# Patient Record
Sex: MALE | Race: WHITE | HISPANIC OR LATINO | Employment: OTHER | URBAN - METROPOLITAN AREA
[De-identification: names, ages, dates, MRNs, and addresses within clinical notes are randomized per-mention and may not be internally consistent; named-entity substitution may affect disease eponyms.]

---

## 2018-10-15 ENCOUNTER — TRANSCRIBE ORDERS (OUTPATIENT)
Dept: ADMINISTRATIVE | Facility: HOSPITAL | Age: 63
End: 2018-10-15

## 2018-10-15 ENCOUNTER — HOSPITAL ENCOUNTER (OUTPATIENT)
Dept: RADIOLOGY | Facility: HOSPITAL | Age: 63
Discharge: HOME | End: 2018-10-15
Payer: MEDICARE

## 2018-10-15 DIAGNOSIS — M79.602 PAIN OF LEFT UPPER EXTREMITY: ICD-10-CM

## 2018-10-15 DIAGNOSIS — M54.2 NECK PAIN: ICD-10-CM

## 2018-10-15 DIAGNOSIS — M54.2 NECK PAIN: Primary | ICD-10-CM

## 2018-10-15 PROCEDURE — 72052 X-RAY EXAM NECK SPINE 6/>VWS: CPT

## 2018-10-15 PROCEDURE — 73030 X-RAY EXAM OF SHOULDER: CPT

## 2019-01-14 ENCOUNTER — HOSPITAL ENCOUNTER (EMERGENCY)
Facility: HOSPITAL | Age: 64
Discharge: HOME | End: 2019-01-14
Attending: EMERGENCY MEDICINE | Admitting: EMERGENCY MEDICINE
Payer: MEDICARE

## 2019-01-14 VITALS
SYSTOLIC BLOOD PRESSURE: 135 MMHG | OXYGEN SATURATION: 94 % | WEIGHT: 170 LBS | TEMPERATURE: 99.9 F | DIASTOLIC BLOOD PRESSURE: 93 MMHG | BODY MASS INDEX: 27.32 KG/M2 | HEART RATE: 86 BPM | HEIGHT: 66 IN | RESPIRATION RATE: 18 BRPM

## 2019-01-14 DIAGNOSIS — J40 BRONCHITIS: Primary | ICD-10-CM

## 2019-01-14 LAB
FLUAV AG SPEC QL IA: NEGATIVE
FLUBV AG SPEC QL IA: NEGATIVE

## 2019-01-14 PROCEDURE — 87631 RESP VIRUS 3-5 TARGETS: CPT | Performed by: EMERGENCY MEDICINE

## 2019-01-14 PROCEDURE — 99283 EMERGENCY DEPT VISIT LOW MDM: CPT

## 2019-01-14 RX ORDER — CEPHALEXIN 250 MG/1
500 CAPSULE ORAL ONCE
Status: DISCONTINUED | OUTPATIENT
Start: 2019-01-14 | End: 2019-01-14

## 2019-01-14 RX ORDER — CEPHALEXIN 250 MG/1
500 CAPSULE ORAL 4 TIMES DAILY
Qty: 40 CAPSULE | Refills: 0 | Status: SHIPPED | OUTPATIENT
Start: 2019-01-14 | End: 2019-01-24

## 2019-01-14 RX ORDER — PANTOPRAZOLE SODIUM 40 MG/1
TABLET, DELAYED RELEASE ORAL
COMMUNITY
Start: 2018-09-05

## 2019-01-14 RX ORDER — SIMVASTATIN 40 MG
80 TABLET ORAL EVERY EVENING
COMMUNITY
Start: 2018-10-15

## 2019-01-14 RX ORDER — METOPROLOL SUCCINATE 25 MG/1
25 TABLET, EXTENDED RELEASE ORAL DAILY
COMMUNITY
Start: 2018-10-15

## 2019-01-14 RX ORDER — GABAPENTIN 300 MG/1
300 CAPSULE ORAL DAILY
COMMUNITY
Start: 2018-11-13 | End: 2019-11-13

## 2019-01-14 RX ORDER — LISINOPRIL 40 MG/1
40 TABLET ORAL DAILY
COMMUNITY
Start: 2018-10-15 | End: 2019-10-15

## 2019-01-14 RX ORDER — ASPIRIN 81 MG/1
324 TABLET, CHEWABLE ORAL DAILY
COMMUNITY
Start: 2018-01-19 | End: 2020-03-23

## 2019-01-14 RX ORDER — GLIPIZIDE 5 MG/1
TABLET ORAL
COMMUNITY
Start: 2018-10-15

## 2019-01-14 RX ORDER — CEPHALEXIN 250 MG/1
500 CAPSULE ORAL ONCE
Status: COMPLETED | OUTPATIENT
Start: 2019-01-14 | End: 2019-01-14

## 2019-01-14 RX ORDER — ALBUTEROL SULFATE 90 UG/1
2 AEROSOL, METERED RESPIRATORY (INHALATION) EVERY 6 HOURS PRN
COMMUNITY
Start: 2018-10-15 | End: 2019-10-15

## 2019-01-14 RX ADMIN — CEPHALEXIN 500 MG: 250 CAPSULE ORAL at 22:16

## 2019-01-15 LAB
FLUAV AG SPEC QL: DETECTED
FLUBV AG SPEC QL: ABNORMAL
RSV B RNA SPEC QL NAA+PROBE: ABNORMAL

## 2019-01-15 NOTE — ED PROVIDER NOTES
History  Chief Complaint   Patient presents with    Cough     wheezing     Recent cough, sinus congestion, fever, muscle aches  His spouse is sick too  Claims a relative currently "has flu"  History provided by:  Patient  Cough   Associated symptoms: fever and sore throat    Associated symptoms: no chest pain, no eye discharge, no headaches, no rash, no shortness of breath and no wheezing        Prior to Admission Medications   Prescriptions Last Dose Informant Patient Reported? Taking? albuterol (PROVENTIL HFA) 90 mcg/act inhaler   Yes Yes   Sig: Inhale 2 puffs every 6 (six) hours as needed   aspirin 81 mg chewable tablet   Yes Yes   Sig: Chew 81 mg   gabapentin (NEURONTIN) 300 mg capsule   Yes Yes   Sig: Take 300 mg by mouth   glipiZIDE (GLUCOTROL) 5 mg tablet   Yes Yes   Si tab with dinner  lisinopril (ZESTRIL) 40 mg tablet   Yes Yes   Sig: Take 40 mg by mouth   metFORMIN (GLUCOPHAGE) 500 mg tablet   Yes Yes   Sig: Take 500 mg by mouth   metoprolol succinate (TOPROL-XL) 25 mg 24 hr tablet   Yes Yes   Sig: Take 25 mg by mouth   pantoprazole (PROTONIX) 40 mg tablet   Yes Yes   Sig: take 1 tablet by mouth once daily   sertraline (ZOLOFT) 50 mg tablet   Yes Yes   Si 5 tab daily  simvastatin (ZOCOR) 40 mg tablet   Yes Yes   Sig: Take 40 mg by mouth      Facility-Administered Medications: None       Past Medical History:   Diagnosis Date    Asthma     Diabetes mellitus ([address] )     GERD (gastroesophageal reflux disease)     Hypertension        Past Surgical History:   Procedure Laterality Date    HERNIA REPAIR         History reviewed  No pertinent family history  I have reviewed and agree with the history as documented  Social History   Substance Use Topics    Smoking status: Light Tobacco Smoker    Smokeless tobacco: Never Used    Alcohol use Yes      Comment: occassionally         Review of Systems   Constitutional: Positive for fatigue and fever     HENT: Positive for postnasal drip, sinus pain and sore throat  Eyes: Negative for pain, discharge and redness  Respiratory: Positive for cough  Negative for chest tightness, shortness of breath and wheezing  Cardiovascular: Negative for chest pain  Gastrointestinal: Negative for abdominal pain and vomiting  Genitourinary: Negative for difficulty urinating and hematuria  Musculoskeletal: Positive for arthralgias  Negative for gait problem  Skin: Negative for rash  Neurological: Negative for syncope and headaches  Psychiatric/Behavioral: Negative for confusion  Physical Exam  Physical Exam   Constitutional: He is oriented to person, place, and time  He appears well-developed and well-nourished  HENT:   Head: Normocephalic and atraumatic  Eyes: Conjunctivae are normal    Neck: Neck supple  Cardiovascular: Normal rate  Pulmonary/Chest: Effort normal and breath sounds normal  No respiratory distress  He has no wheezes  He has no rales  He exhibits no tenderness  Abdominal: He exhibits no distension  Neurological: He is alert and oriented to person, place, and time  Coordination normal    Skin: Skin is warm  Psychiatric: He has a normal mood and affect  His behavior is normal    Nursing note and vitals reviewed        Vital Signs  ED Triage Vitals [01/14/19 2103]   Temperature Pulse Respirations Blood Pressure SpO2   100 5 °F (38 1 °C) 82 18 145/96 94 %      Temp Source Heart Rate Source Patient Position - Orthostatic VS BP Location FiO2 (%)   Tympanic Monitor Sitting Left arm --      Pain Score       No Pain           Vitals:    01/14/19 2103 01/14/19 2218   BP: 145/96 135/93   Pulse: 82 86   Patient Position - Orthostatic VS: Sitting Sitting       Visual Acuity      ED Medications  Medications   cephalexin (KEFLEX) capsule 500 mg (500 mg Oral Given 1/14/19 2216)       Diagnostic Studies  Results Reviewed     Procedure Component Value Units Date/Time    Rapid Influenza Screen with Reflex PCR [[id number]]  (Normal) Collected:  01/14/19 2130    Lab Status:  Final result Specimen:  Nasopharyngeal from Nasopharyngeal Swab Updated:  01/14/19 2158     Rapid Influenza A Ag Negative     Rapid Influenza B Ag Negative    INFLUENZA A/B AND RSV, PCR [[id number]] Collected:  01/14/19 2130    Lab Status: In process Specimen:  Nasopharyngeal from Nasopharyngeal Swab Updated:  01/14/19 2158                 No orders to display              Procedures  Procedures       Phone Contacts  ED Phone Contact    ED Course                               MDM  CritCare Time    Disposition  Final diagnoses:   Bronchitis     Time reflects when diagnosis was documented in both MDM as applicable and the Disposition within this note     Time User Action Codes Description Comment    1/14/2019 10:10 PM Dylan, [address] Bronchitis       ED Disposition     ED Disposition Condition Comment    Discharge  Mohamud Boo discharge to home/self care      Condition at discharge: Stable        Follow-up Information    None         Discharge Medication List as of 1/14/2019 10:13 PM      START taking these medications    Details   cephalexin (KEFLEX) 250 mg capsule Take 2 capsules (500 mg total) by mouth 4 (four) times a day for 10 days, Starting Mon 1/14/2019, Until Thu 1/24/2019, Print         CONTINUE these medications which have NOT CHANGED    Details   albuterol (PROVENTIL HFA) 90 mcg/act inhaler Inhale 2 puffs every 6 (six) hours as needed, Starting Mon 10/15/2018, Until Tue 10/15/2019, Historical Med      aspirin 81 mg chewable tablet Chew 81 mg, Starting Fri 1/19/2018, Until Sat 1/19/2019, Historical Med      gabapentin (NEURONTIN) 300 mg capsule Take 300 mg by mouth, Starting Tue 11/13/2018, Until Wed 11/13/2019, Historical Med      glipiZIDE (GLUCOTROL) 5 mg tablet 1 tab with dinner , Historical Med      lisinopril (ZESTRIL) 40 mg tablet Take 40 mg by mouth, Starting Mon 10/15/2018, Until Tue 10/15/2019, Historical Med      metFORMIN (GLUCOPHAGE) 500 mg tablet Take 500 mg by mouth, Starting Mon 10/15/2018, Historical Med      metoprolol succinate (TOPROL-XL) 25 mg 24 hr tablet Take 25 mg by mouth, Starting Mon 10/15/2018, Historical Med      pantoprazole (PROTONIX) 40 mg tablet take 1 tablet by mouth once daily, Historical Med      sertraline (ZOLOFT) 50 mg tablet 1 5 tab daily  , Historical Med      simvastatin (ZOCOR) 40 mg tablet Take 40 mg by mouth, Starting Mon 10/15/2018, Historical Med           No discharge procedures on file      ED Provider  Electronically Signed by           Vera López MD  01/14/19 0938

## 2019-01-15 NOTE — DISCHARGE INSTRUCTIONS
Acute Bronchitis   WHAT YOU NEED TO KNOW:   Acute bronchitis is swelling and irritation in the air passages of your lungs  This irritation may cause you to cough or have other breathing problems  Acute bronchitis often starts because of another illness, such as a cold or the flu  The illness spreads from your nose and throat to your windpipe and airways  Bronchitis is often called a chest cold  Acute bronchitis lasts about 3 to 6 weeks and is usually not a serious illness  Your cough can last for several weeks  DISCHARGE INSTRUCTIONS:   Return to the emergency department if:   · You cough up blood  · Your lips or fingernails turn blue  · You feel like you are not getting enough air when you breathe  Contact your healthcare provider if:   · You have a fever  · Your breathing problems do not go away or get worse  · Your cough does not get better within 4 weeks  · You have questions or concerns about your condition or care  Self-care:   · Get more rest   Rest helps your body to heal  Slowly start to do more each day  Rest when you feel it is needed  · Avoid irritants in the air  Avoid chemicals, fumes, and dust  Wear a face mask if you must work around dust or fumes  Stay inside on days when air pollution levels are high  If you have allergies, stay inside when pollen counts are high  Do not use aerosol products, such as spray-on deodorant, bug spray, and hair spray  · Do not smoke or be around others who smoke  Nicotine and other chemicals in cigarettes and cigars damages the cilia that move mucus out of your lungs  Ask your healthcare provider for information if you currently smoke and need help to quit  E-cigarettes or smokeless tobacco still contain nicotine  Talk to your healthcare provider before you use these products  · Drink liquids as directed  Liquids help keep your air passages moist and help you cough up mucus   You may need to drink more liquids when you have acute bronchitis  Ask how much liquid to drink each day and which liquids are best for you  · Use a humidifier or vaporizer  Use a cool mist humidifier or a vaporizer to increase air moisture in your home  This may make it easier for you to breathe and help decrease your cough  Decrease risk for acute bronchitis:   · Get the vaccinations you need  Ask your healthcare provider if you should get vaccinated against the flu or pneumonia  · Prevent the spread of germs  You can decrease your risk of acute bronchitis and other illnesses by doing the following:     [facility] your hands often with soap and water  Carry germ-killing hand lotion or gel with you  You can use the lotion or gel to clean your hands when soap and water are not available  ¨ Do not touch your eyes, nose, or mouth unless you have washed your hands first     ¨ Always cover your mouth when you cough to prevent the spread of germs  It is best to cough into a tissue or your shirt sleeve instead of into your hand  Ask those around you cover their mouths when they cough  ¨ Try to avoid people who have a cold or the flu  If you are sick, stay away from others as much as possible  Medicines: Your healthcare provider may  give you any of the following:  · Ibuprofen or acetaminophen  are medicines that help lower your fever  They are available without a doctor's order  Ask your healthcare provider which medicine is right for you  Ask how much to take and how often to take it  Follow directions  These medicines can cause stomach bleeding if not taken correctly  Ibuprofen can cause kidney damage  Do not take ibuprofen if you have kidney disease, an ulcer, or allergies to aspirin  Acetaminophen can cause liver damage  Do not take more than 4,000 milligrams in 24 hours  · Decongestants  help loosen mucus in your lungs and make it easier to cough up  This can help you breathe easier  · Cough suppressants  decrease your urge to cough   If your cough produces mucus, do not take a cough suppressant unless your healthcare provider tells you to  Your healthcare provider may suggest that you take a cough suppressant at night so you can rest     · Inhalers  may be given  Your healthcare provider may give you one or more inhalers to help you breathe easier and cough less  An inhaler gives your medicine to open your airways  Ask your healthcare provider to show you how to use your inhaler correctly  · Take your medicine as directed  Contact your healthcare provider if you think your medicine is not helping or if you have side effects  Tell him of her if you are allergic to any medicine  Keep a list of the medicines, vitamins, and herbs you take  Include the amounts, and when and why you take them  Bring the list or the pill bottles to follow-up visits  Carry your medicine list with you in case of an emergency  Follow up with your healthcare provider as directed:  Write down questions you have so you will remember to ask them during your follow-up visits  © 2017 [employer] Information is for End User's use only and may not be sold, redistributed or otherwise used for commercial purposes  All illustrations and images included in CareNotes® are the copyrighted property of [employer]  or [employer]  The above information is an  only  It is not intended as medical advice for individual conditions or treatments  Talk to your doctor, nurse or pharmacist before following any medical regimen to see if it is safe and effective for you   ~~~    Rest     Treat any fever with Tylenol or Motrin  Use antibiotic 4 times a day for 10 days  If not better by Thursday, then see your regular Family Doctor in the office for recheck and medication advice      ~~~

## 2019-01-27 ENCOUNTER — HOSPITAL ENCOUNTER (EMERGENCY)
Facility: HOSPITAL | Age: 64
Discharge: HOME | End: 2019-01-27
Payer: MEDICARE

## 2019-01-27 VITALS
BODY MASS INDEX: 25.05 KG/M2 | RESPIRATION RATE: 18 BRPM | HEART RATE: 103 BPM | DIASTOLIC BLOOD PRESSURE: 89 MMHG | TEMPERATURE: 98 F | WEIGHT: 175 LBS | HEIGHT: 70 IN | SYSTOLIC BLOOD PRESSURE: 157 MMHG | OXYGEN SATURATION: 96 %

## 2019-01-27 DIAGNOSIS — H10.9 CONJUNCTIVITIS: Primary | ICD-10-CM

## 2019-01-27 PROCEDURE — 99283 EMERGENCY DEPT VISIT LOW MDM: CPT

## 2019-01-27 RX ORDER — TOBRAMYCIN AND DEXAMETHASONE 3; 1 MG/ML; MG/ML
1 SUSPENSION/ DROPS OPHTHALMIC
Qty: 1.75 ML | Refills: 0 | Status: SHIPPED | OUTPATIENT
Start: 2019-01-27 | End: 2020-03-27 | Stop reason: HOSPADM

## 2019-01-27 NOTE — ED PROVIDER NOTES
History  Chief Complaint   Patient presents with    Eye Swelling     left >R     Maylon Krabbe Tomasita Fly is a 25-year-old male who came to the emergency department due to redness on the left eye accompanied by swelling of the upper eyelid for the last 2 days  Patient denies fever chills  He denies trauma to the left eye  He denies blurring of vision or loss of vision on the involved eye  History provided by:  Patient   used: No    Eye Problem   Location:  Left eye  Quality:  Tearing and sharp  Severity:  Mild  Onset quality:  Gradual  Duration:  2 days  Timing:  Constant  Progression:  Unchanged  Chronicity:  New  Context: not burn, not chemical exposure, not contact lens problem, not direct trauma, not foreign body, not using machinery, not scratch, not smoke exposure and not UV exposure    Relieved by:  Nothing  Worsened by:  Nothing  Ineffective treatments:  None tried  Associated symptoms: inflammation, redness and tearing    Associated symptoms: no blurred vision, no crusting, no decreased vision, no discharge, no double vision, no facial rash, no headaches, no itching, no nausea, no numbness, no photophobia, no scotomas, no swelling, no tingling, no vomiting and no weakness        Prior to Admission Medications   Prescriptions Last Dose Informant Patient Reported? Taking? albuterol (PROVENTIL HFA) 90 mcg/act inhaler   Yes No   Sig: Inhale 2 puffs every 6 (six) hours as needed   aspirin 81 mg chewable tablet   Yes No   Sig: Chew 81 mg   gabapentin (NEURONTIN) 300 mg capsule   Yes No   Sig: Take 300 mg by mouth   glipiZIDE (GLUCOTROL) 5 mg tablet   Yes No   Si tab with dinner     lisinopril (ZESTRIL) 40 mg tablet   Yes No   Sig: Take 40 mg by mouth   metFORMIN (GLUCOPHAGE) 500 mg tablet   Yes No   Sig: Take 500 mg by mouth   metoprolol succinate (TOPROL-XL) 25 mg 24 hr tablet   Yes No   Sig: Take 25 mg by mouth   pantoprazole (PROTONIX) 40 mg tablet   Yes No   Sig: take 1 tablet by mouth once daily   sertraline (ZOLOFT) 50 mg tablet   Yes No   Si 5 tab daily  simvastatin (ZOCOR) 40 mg tablet   Yes No   Sig: Take 40 mg by mouth      Facility-Administered Medications: None       Past Medical History:   Diagnosis Date    Asthma     Diabetes mellitus ([address] )     GERD (gastroesophageal reflux disease)     Hypertension        Past Surgical History:   Procedure Laterality Date    HERNIA REPAIR         History reviewed  No pertinent family history  I have reviewed and agree with the history as documented  Social History   Substance Use Topics    Smoking status: Light Tobacco Smoker    Smokeless tobacco: Never Used    Alcohol use Yes      Comment: occassionally         Review of Systems   Constitutional: Negative  Eyes: Positive for redness  Negative for blurred vision, double vision, photophobia, discharge and itching  Respiratory: Negative  Cardiovascular: Negative  Gastrointestinal: Negative  Negative for nausea and vomiting  Genitourinary: Negative  Musculoskeletal: Negative  Allergic/Immunologic: Negative  Neurological: Negative for tingling, weakness, numbness and headaches  Hematological: Negative  Psychiatric/Behavioral: Negative  Physical Exam  Physical Exam   Constitutional: He is oriented to person, place, and time  He appears well-developed and well-nourished  No distress  HENT:   Head: Normocephalic and atraumatic  Right Ear: External ear normal    Left Ear: External ear normal    Nose: Nose normal    Mouth/Throat: Oropharynx is clear and moist  No oropharyngeal exudate  Eyes: Pupils are equal, round, and reactive to light  EOM are normal  Left eye exhibits discharge  Left conjunctiva is injected  Neck: Normal range of motion  Neck supple  No tracheal deviation present  No thyromegaly present  Cardiovascular: Normal rate, regular rhythm and normal heart sounds      Pulmonary/Chest: Effort normal and breath sounds normal  No respiratory distress  Abdominal: Soft  Bowel sounds are normal  He exhibits no distension  There is no tenderness  Musculoskeletal: Normal range of motion  He exhibits no edema, tenderness or deformity  Lymphadenopathy:     He has no cervical adenopathy  Neurological: He is alert and oriented to person, place, and time  No cranial nerve deficit or sensory deficit  He exhibits normal muscle tone  Coordination normal    Skin: Skin is warm and dry  No rash noted  He is not diaphoretic  No erythema  No pallor  Psychiatric: He has a normal mood and affect  His behavior is normal  Judgment and thought content normal    Nursing note and vitals reviewed        Vital Signs  ED Triage Vitals [01/27/19 0843]   Temperature Pulse Respirations Blood Pressure SpO2   98 °F (36 7 °C) 103 18 157/89 96 %      Temp Source Heart Rate Source Patient Position - Orthostatic VS BP Location FiO2 (%)   Tympanic Monitor Sitting Left arm --      Pain Score       No Pain           Vitals:    01/27/19 0843   BP: 157/89   Pulse: 103   Patient Position - Orthostatic VS: Sitting       Visual Acuity      ED Medications  Medications - No data to display    Diagnostic Studies  Results Reviewed     None                 No orders to display              Procedures  Procedures       Phone Contacts  ED Phone Contact    ED Course                               MDM  Number of Diagnoses or Management Options  Conjunctivitis: minor  Risk of Complications, Morbidity, and/or Mortality  Presenting problems: minimal  Management options: minimal    Patient Progress  Patient progress: stable    CritCare Time    Disposition  Final diagnoses:   Conjunctivitis     Time reflects when diagnosis was documented in both MDM as applicable and the Disposition within this note     Time User Action Codes Description Comment    1/27/2019  8:47 AM Mireya Lewis Add [H10 9] Conjunctivitis       ED Disposition     ED Disposition Condition Comment    Discharge [address] discharge to home/self care  Condition at discharge: Good        Follow-up Information     Follow up With Specialties Details Why Contact Info    Primary care physician  In 3 days            Patient's Medications   Discharge Prescriptions    TOBRAMYCIN-DEXAMETHASONE (TOBRADEX) OPHTHALMIC SUSPENSION    Administer 1 drop into the left eye every 4 (four) hours while awake for 7 days       Start Date: 1/27/2019 End Date: 2/3/2019       Order Dose: 1 drop       Quantity: 1 75 mL    Refills: 0     No discharge procedures on file      ED Provider  Electronically Signed by           Joseph Martinez MD  01/27/19 9608

## 2019-01-27 NOTE — DISCHARGE INSTRUCTIONS
Conjunctivitis   WHAT YOU SHOULD KNOW:   Conjunctivitis, or pink eye, is inflammation of your conjunctiva  The conjunctiva is a thin tissue that covers the front of your eye and the back of your eyelids  The conjunctiva helps protect your eye and keep it moist         INSTRUCTIONS:   Medicines:   · Allergy medicine: This medicine helps decrease itchy, red, swollen eyes caused by allergies  It may be given as a pill, eye drops, or nasal spray  · Antibiotics:  You will need antibiotics if your conjunctivitis is caused by bacteria  This medicine may be given as eye drops or eye ointment  · Steroid medicine: This medicine helps decrease inflammation  It may be given as a pill, eye drops, or nasal spray  · Take your medicine as directed  Call your healthcare provider if you think your medicine is not helping or if you have side effects  Tell him if you are allergic to any medicine  Keep a list of the medicines, vitamins, and herbs you take  Include the amounts, and when and why you take them  Bring the list or the pill bottles to follow-up visits  Carry your medicine list with you in case of an emergency  Follow up with your primary healthcare provider as directed: You may need to return for more tests on your eyes  These will help your primary healthcare provider check for eye damage  Write down your questions so you remember to ask them during your visits  Avoid the spread of conjunctivitis:   · Wash your hands often:  Wash your hands before you touch your eyes  Also wash your hands before you prepare or eat food and after you use the bathroom or change a diaper  · Avoid allergens:  Try to avoid the things that cause your allergies, such as pets, dust, or grass  · Avoid contact:  Do not share towels or washcloths  Try to stay away from others as much as possible  Ask when you can return to work or school  · Throw away eye makeup:  Throw away mascara and other eye makeup    Manage your symptoms:  · Apply a cool compress:  Wet a washcloth with cold water and place it on your eye  This will help decrease swelling  · Use eye drops:  Eye drops, or artificial tears, can be bought without a doctor's order  They help keep your eye moist     · Do not wear contact lenses: They can irritate your eye  Throw away the pair you are using and ask when you can wear them again  Use a new pair of lenses when your primary healthcare provider says it is okay  · Flush your eye:  You may need to flush your eye with saline to help decrease your symptoms  Ask for more information on how to flush your eye  Contact your primary healthcare provider if:   · Your eyesight becomes blurry  · You have tiny bumps or spots of blood on your eye  · You have questions or concerns about your condition or care  Return to the emergency department if:   · The swelling in your eye gets worse, even after treatment  · Your vision suddenly becomes worse or you cannot see at all  · Your eye begins to bleed  © 2014 [address] is for End User's use only and may not be sold, redistributed or otherwise used for commercial purposes  All illustrations and images included in CareNotes® are the copyrighted property of [employer]  or [employer]  The above information is an  only  It is not intended as medical advice for individual conditions or treatments  Talk to your doctor, nurse or pharmacist before following any medical regimen to see if it is safe and effective for you  Conjunctivitis   GENERAL INFORMATION:   What is conjunctivitis? Conjunctivitis, or pink eye, is inflammation of your conjunctiva  The conjunctiva is a thin tissue that covers the front of your eye and the back of your eyelids  The conjunctiva helps protect your eye and keep it moist         What causes conjunctivitis? Conjunctivitis is easily spread from person to person   The most common cause of conjunctivitis is infection with bacteria or a virus  This often happens when bacteria are introduced to your eye  For example, this can happen when you touch your eye or wear contact lenses  Allergies are also a common cause of conjunctivitis  The cells in your conjunctiva can react to an allergen  Some examples of allergens include grass, dust, animal fur, or mascara  What are the signs and symptoms of conjunctivitis? You will usually have symptoms in both eyes if your conjunctivitis is caused by allergies  You may also have other allergic symptoms, such as a rash or runny nose  Symptoms will usually start in 1 eye if your conjunctivitis is caused by a virus or bacteria  You may also have other symptoms of an infection, such as sore throat and fever  You may have any of the following:  · Redness in the whites of your eye    · Itching in your eye or around your eye    · Feeling like there is something in your eye    · Watery or thick, sticky discharge    · Crusty eyelids when you wake up in the morning    · Burning, stinging, or swelling in your eye    · Pain when you see bright light  How is conjunctivitis diagnosed? Your caregiver will ask about your symptoms and medical history  He will ask if you have been around anyone who is sick or has pink eye  He will ask if you have allergies  Tell him if you wear contact lenses  You may need any of the following:  · Eye exam:  Your caregiver will look at your eyes, eyelids, eyelashes, and the skin around your eyes  He will ask you to look in different directions  He may gently press on your eye or eyelid to see if there is discharge  He will also look for redness and swelling in your eyelids or conjunctiva  Your caregiver may gently swab your conjunctiva with a cotton swab and send it to the lab for tests  This will help your caregiver find out what is causing your conjunctivitis  · Slit-lamp microscope:   Your caregiver will use a special microscope with a bright light to look into your eye  He will look for signs of infection or inflammation  This microscope also helps your caregiver see if the different parts of your eyes are healthy  How is conjunctivitis treated? Your conjunctivitis may go away on its own  Treatment depends on what is causing your conjunctivitis  You may need any of the following:  · Allergy medicine: This medicine helps decrease itchy, red, swollen eyes caused by allergies  It may be given as a pill, eye drops, or nasal spray  · Antibiotics:  You may need antibiotics if your conjunctivitis is caused by bacteria  This medicine may be given as a pill, eye drops, or eye ointment  · Steroid medicine: This medicine helps decrease inflammation  It may be given as a pill, eye drops, or nasal spray  How can I manage my symptoms? · Apply a cool compress:  Wet a washcloth with cold water and place it on your eye  This will help decrease swelling  · Use eye drops:  Eye drops, or artificial tears, can be bought without a doctor's order  They help keep your eye moist     · Do not wear contact lenses: They can irritate your eye  Throw away the pair you are using and ask when you can wear them again  Use a new pair of lenses when your caregiver says it is okay  · Flush your eye:  You may need to flush your eye with saline to help decrease your symptoms  Ask for more information on how to flush your eye  How do I prevent the spread of conjunctivitis? · Wash your hands often:  Wash your hands before you touch your eyes  Also wash your hands before you prepare or eat food and after you use the bathroom or change a diaper  · Avoid allergens:  Try to avoid the things that cause your allergies, such as pets, dust, or grass  · Avoid contact:  Do not share towels or washcloths  Try to stay away from others as much as possible  Ask when you can return to work or school       · Throw away eye makeup:  Throw away mascara and other eye makeup  What are the risks of conjunctivitis? You may have a burning, itching, or stinging feeling in your eye when you use eye drops or ointment  Your eye medicine may cause your symptoms, such as eye swelling, to get worse  Your eyes may become sensitive to light  Without treatment, you may get scars or sores in your eye  The swelling in your eye can cause your eyesight to get blurry  You may lose vision completely  The bacteria may spread to other parts of your eye, your sinuses, or the tissues in your brain  This can be life-threatening  Ask your caregiver if you have questions about the risks of conjunctivitis  When should I contact my caregiver? Contact your caregiver if:  · Your eyesight becomes blurry  · You have tiny bumps or spots of blood on your eye  · You have questions or concerns about your condition or care  When should I seek immediate care? Seek care immediately or call 911 if:  · The swelling in your eye gets worse, even after treatment  · Your vision suddenly becomes worse or you cannot see at all  · Your eye begins to bleed  CARE AGREEMENT:   You have the right to help plan your care  Learn about your health condition and how it may be treated  Discuss treatment options with your caregivers to decide what care you want to receive  You always have the right to refuse treatment  The above information is an  only  It is not intended as medical advice for individual conditions or treatments  Talk to your doctor, nurse or pharmacist before following any medical regimen to see if it is safe and effective for you  © 2014 [address] is for End User's use only and may not be sold, redistributed or otherwise used for commercial purposes  All illustrations and images included in CareNotes® are the copyrighted property of A D [employer] M [employer] Inc  or [employer]

## 2020-02-03 ENCOUNTER — HOSPITAL ENCOUNTER (EMERGENCY)
Facility: HOSPITAL | Age: 65
Discharge: HOME | End: 2020-02-03
Attending: FAMILY MEDICINE | Admitting: EMERGENCY MEDICINE
Payer: MEDICARE

## 2020-02-03 ENCOUNTER — APPOINTMENT (EMERGENCY)
Dept: CT IMAGING | Facility: HOSPITAL | Age: 65
End: 2020-02-03
Payer: MEDICARE

## 2020-02-03 VITALS
HEART RATE: 84 BPM | OXYGEN SATURATION: 96 % | WEIGHT: 198 LBS | BODY MASS INDEX: 28.35 KG/M2 | RESPIRATION RATE: 17 BRPM | HEIGHT: 70 IN | DIASTOLIC BLOOD PRESSURE: 99 MMHG | SYSTOLIC BLOOD PRESSURE: 165 MMHG | TEMPERATURE: 98.6 F

## 2020-02-03 DIAGNOSIS — R10.9 FLANK PAIN: ICD-10-CM

## 2020-02-03 DIAGNOSIS — D64.9 ACUTE ANEMIA: ICD-10-CM

## 2020-02-03 DIAGNOSIS — R14.0 ABDOMINAL DISTENTION: ICD-10-CM

## 2020-02-03 DIAGNOSIS — R10.9 LEFT FLANK PAIN: Primary | ICD-10-CM

## 2020-02-03 LAB
ALBUMIN SERPL BCP-MCNC: 4.4 G/DL (ref 3.5–5.7)
ALP SERPL-CCNC: 50 U/L (ref 55–165)
ALT SERPL W P-5'-P-CCNC: 14 U/L (ref 7–52)
ANION GAP SERPL CALCULATED.3IONS-SCNC: 9 MMOL/L (ref 4–13)
ANISOCYTOSIS BLD QL SMEAR: PRESENT
AST SERPL W P-5'-P-CCNC: 27 U/L (ref 13–39)
BASOPHILS # BLD AUTO: 0.2 THOUSANDS/ΜL (ref 0–0.1)
BASOPHILS NFR BLD AUTO: 2 % (ref 0–2)
BILIRUB SERPL-MCNC: 0.5 MG/DL (ref 0.2–1)
BILIRUB UR QL STRIP: NEGATIVE
BUN SERPL-MCNC: 17 MG/DL (ref 7–25)
CALCIUM SERPL-MCNC: 9.2 MG/DL (ref 8.6–10.5)
CHLORIDE SERPL-SCNC: 99 MMOL/L (ref 98–107)
CLARITY UR: CLEAR
CO2 SERPL-SCNC: 25 MMOL/L (ref 21–31)
COLOR UR: ABNORMAL
CREAT SERPL-MCNC: 1.24 MG/DL (ref 0.7–1.3)
DACRYOCYTES BLD QL SMEAR: PRESENT
EOSINOPHIL # BLD AUTO: 0 THOUSAND/ΜL (ref 0–0.61)
EOSINOPHIL NFR BLD AUTO: 0 % (ref 0–5)
ERYTHROCYTE [DISTWIDTH] IN BLOOD BY AUTOMATED COUNT: 17.4 % (ref 11.5–14.5)
GFR SERPL CREATININE-BSD FRML MDRD: 60 ML/MIN/1.73SQ M
GLUCOSE SERPL-MCNC: 98 MG/DL (ref 65–99)
GLUCOSE UR STRIP-MCNC: NEGATIVE MG/DL
HCT VFR BLD AUTO: 32.1 % (ref 42–47)
HGB BLD-MCNC: 9.9 G/DL (ref 14–18)
HGB UR QL STRIP.AUTO: NEGATIVE
HYPERCHROMIA BLD QL SMEAR: PRESENT
INR PPP: 0.97 (ref 0.9–1.5)
KETONES UR STRIP-MCNC: NEGATIVE MG/DL
LEUKOCYTE ESTERASE UR QL STRIP: NEGATIVE
LIPASE SERPL-CCNC: 68 U/L (ref 11–82)
LYMPHOCYTES # BLD AUTO: 2 THOUSANDS/ΜL (ref 0.6–4.47)
LYMPHOCYTES NFR BLD AUTO: 22 % (ref 21–51)
MCH RBC QN AUTO: 22.4 PG (ref 26–34)
MCHC RBC AUTO-ENTMCNC: 30.9 G/DL (ref 31–37)
MCV RBC AUTO: 72 FL (ref 81–99)
MICROCYTES BLD QL AUTO: PRESENT
MONOCYTES # BLD AUTO: 0.8 THOUSAND/ΜL (ref 0.17–1.22)
MONOCYTES NFR BLD AUTO: 9 % (ref 2–12)
NEUTROPHILS # BLD AUTO: 6 THOUSANDS/ΜL (ref 1.4–6.5)
NEUTS SEG NFR BLD AUTO: 67 % (ref 42–75)
NITRITE UR QL STRIP: NEGATIVE
OVALOCYTES BLD QL SMEAR: PRESENT
PH UR STRIP.AUTO: 6 [PH]
PLATELET # BLD AUTO: 306 THOUSANDS/UL (ref 149–390)
PLATELET BLD QL SMEAR: ADEQUATE
PMV BLD AUTO: 6.4 FL (ref 8.6–11.7)
POIKILOCYTOSIS BLD QL SMEAR: PRESENT
POLYCHROMASIA BLD QL SMEAR: PRESENT
POTASSIUM SERPL-SCNC: 3.6 MMOL/L (ref 3.5–5.5)
PROT SERPL-MCNC: 7.5 G/DL (ref 6.4–8.9)
PROT UR STRIP-MCNC: NEGATIVE MG/DL
PROTHROMBIN TIME: 11.3 SECONDS (ref 10.2–13)
RBC # BLD AUTO: 4.44 MILLION/UL (ref 4.3–5.9)
RBC MORPH BLD: NORMAL
SODIUM SERPL-SCNC: 133 MMOL/L (ref 134–143)
SP GR UR STRIP.AUTO: <=1.005 (ref 1–1.03)
UROBILINOGEN UR QL STRIP.AUTO: 0.2 E.U./DL
WBC # BLD AUTO: 9 THOUSAND/UL (ref 4.8–10.8)

## 2020-02-03 PROCEDURE — 96374 THER/PROPH/DIAG INJ IV PUSH: CPT

## 2020-02-03 PROCEDURE — 85610 PROTHROMBIN TIME: CPT | Performed by: FAMILY MEDICINE

## 2020-02-03 PROCEDURE — 96375 TX/PRO/DX INJ NEW DRUG ADDON: CPT

## 2020-02-03 PROCEDURE — 36415 COLL VENOUS BLD VENIPUNCTURE: CPT | Performed by: FAMILY MEDICINE

## 2020-02-03 PROCEDURE — 74177 CT ABD & PELVIS W/CONTRAST: CPT

## 2020-02-03 PROCEDURE — 99284 EMERGENCY DEPT VISIT MOD MDM: CPT | Performed by: FAMILY MEDICINE

## 2020-02-03 PROCEDURE — 81003 URINALYSIS AUTO W/O SCOPE: CPT | Performed by: FAMILY MEDICINE

## 2020-02-03 PROCEDURE — 80053 COMPREHEN METABOLIC PANEL: CPT | Performed by: FAMILY MEDICINE

## 2020-02-03 PROCEDURE — 85025 COMPLETE CBC W/AUTO DIFF WBC: CPT | Performed by: FAMILY MEDICINE

## 2020-02-03 PROCEDURE — 83690 ASSAY OF LIPASE: CPT | Performed by: FAMILY MEDICINE

## 2020-02-03 PROCEDURE — 99284 EMERGENCY DEPT VISIT MOD MDM: CPT

## 2020-02-03 RX ORDER — KETOROLAC TROMETHAMINE 30 MG/ML
30 INJECTION, SOLUTION INTRAMUSCULAR; INTRAVENOUS ONCE
Status: COMPLETED | OUTPATIENT
Start: 2020-02-03 | End: 2020-02-03

## 2020-02-03 RX ORDER — ONDANSETRON 2 MG/ML
4 INJECTION INTRAMUSCULAR; INTRAVENOUS ONCE
Status: COMPLETED | OUTPATIENT
Start: 2020-02-03 | End: 2020-02-03

## 2020-02-03 RX ADMIN — KETOROLAC TROMETHAMINE 30 MG: 30 INJECTION, SOLUTION INTRAMUSCULAR; INTRAVENOUS at 18:14

## 2020-02-03 RX ADMIN — ONDANSETRON 4 MG: 2 INJECTION INTRAMUSCULAR; INTRAVENOUS at 18:12

## 2020-02-03 RX ADMIN — IOHEXOL 85 ML: 350 INJECTION, SOLUTION INTRAVENOUS at 19:17

## 2020-02-03 NOTE — ED PROVIDER NOTES
History  Chief Complaint   Patient presents with    Flank Pain     onset yesterday       History provided by:  Patient and spouse   used: No      This is a 57-year-old male presented ED with complain of left flank abdominal pain x2 days  Patient states that his pain started yesterday and has been getting worse  He had 1 episode of vomiting this morning  He is rating his pain 6/10 at this time  He states nothing makes the pain better or worse  He states he did not take anything for pain  He is denying any fever chills at this time does complain of nausea and 1 episode of vomiting  Patient also complain of abdominal distention states that he is a drinker but only drank 3 to 4 times a week  He states that he will drink couple of beers a day  He denies any history of ascites in the past   Patient is denying any trouble urinating or any trouble with the stool  Prior to Admission Medications   Prescriptions Last Dose Informant Patient Reported? Taking?   aspirin 81 mg chewable tablet   Yes No   Sig: Chew 81 mg   gabapentin (NEURONTIN) 300 mg capsule   Yes No   Sig: Take 300 mg by mouth   glipiZIDE (GLUCOTROL) 5 mg tablet   Yes No   Si tab with dinner  lisinopril (ZESTRIL) 40 mg tablet   Yes No   Sig: Take 40 mg by mouth   metFORMIN (GLUCOPHAGE) 500 mg tablet   Yes No   Sig: Take 500 mg by mouth   metoprolol succinate (TOPROL-XL) 25 mg 24 hr tablet   Yes No   Sig: Take 25 mg by mouth   pantoprazole (PROTONIX) 40 mg tablet   Yes No   Sig: take 1 tablet by mouth once daily   sertraline (ZOLOFT) 50 mg tablet   Yes No   Si 5 tab daily     simvastatin (ZOCOR) 40 mg tablet   Yes No   Sig: Take 40 mg by mouth   tobramycin-dexamethasone (TOBRADEX) ophthalmic suspension   No No   Sig: Administer 1 drop into the left eye every 4 (four) hours while awake for 7 days      Facility-Administered Medications: None       Past Medical History:   Diagnosis Date    Asthma     Diabetes mellitus ([address] )  GERD (gastroesophageal reflux disease)     Hypertension        Past Surgical History:   Procedure Laterality Date    HERNIA REPAIR         History reviewed  No pertinent family history  I have reviewed and agree with the history as documented  Social History     Tobacco Use    Smoking status: Heavy Tobacco Smoker     Packs/day: 0 50    Smokeless tobacco: Never Used   Substance Use Topics    Alcohol use: Yes     Comment: occassionally     Drug use: No        Review of Systems   Constitutional: Negative  HENT: Negative  Eyes: Negative  Respiratory: Negative  Cardiovascular: Negative  Gastrointestinal: Positive for abdominal pain, nausea and vomiting  Negative for diarrhea  Endocrine: Negative  Genitourinary: Positive for flank pain  Skin: Negative  Neurological: Negative  Psychiatric/Behavioral: Negative  Physical Exam  Physical Exam   Constitutional: He is oriented to person, place, and time  He appears well-developed and well-nourished  No distress  HENT:   Head: Normocephalic and atraumatic  Eyes: Pupils are equal, round, and reactive to light  Conjunctivae and EOM are normal    Neck: Normal range of motion  Neck supple  Cardiovascular: Normal rate, regular rhythm and normal heart sounds  Pulmonary/Chest: Effort normal and breath sounds normal    Abdominal: Soft  He exhibits distension  There is tenderness (right flank region)  Musculoskeletal: Normal range of motion  Neurological: He is alert and oriented to person, place, and time  Skin: Skin is warm  Nursing note and vitals reviewed        Vital Signs  ED Triage Vitals [02/03/20 1754]   Temperature Pulse Respirations Blood Pressure SpO2   98 6 °F (37 °C) 84 17 165/99 96 %      Temp Source Heart Rate Source Patient Position - Orthostatic VS BP Location FiO2 (%)   Temporal Monitor Sitting Left arm --      Pain Score       6           Vitals:    02/03/20 1754   BP: 165/99   Pulse: 84   Patient Position - Orthostatic VS: Sitting         Visual Acuity      ED Medications  Medications   ketorolac (TORADOL) injection 30 mg (30 mg Intravenous Given 2/3/20 1814)   ondansetron (ZOFRAN) injection 4 mg (4 mg Intravenous Given 2/3/20 1812)       Diagnostic Studies  Results Reviewed     Procedure Component Value Units Date/Time    Protime-INR [[id number]]  (Normal) Collected:  02/03/20 1807    Lab Status:  Final result Specimen:  Blood from Arm, Left Updated:  02/03/20 1849     Protime 11 3 seconds      INR 0 97    Comprehensive metabolic panel [[id number]]  (Abnormal) Collected:  02/03/20 1807    Lab Status:  Final result Specimen:  Blood from Arm, Left Updated:  02/03/20 1836     Sodium 133 mmol/L      Potassium 3 6 mmol/L      Chloride 99 mmol/L      CO2 25 mmol/L      ANION GAP 9 mmol/L      BUN 17 mg/dL      Creatinine 1 24 mg/dL      Glucose 98 mg/dL      Calcium 9 2 mg/dL      AST 27 U/L      ALT 14 U/L      Alkaline Phosphatase 50 U/L      Total Protein 7 5 g/dL      Albumin 4 4 g/dL      Total Bilirubin 0 50 mg/dL      eGFR 60 ml/min/1 73sq m     Narrative:       [place] guidelines for Chronic Kidney Disease (CKD):     Stage 1 with normal or high GFR (GFR > 90 mL/min/1 73 square meters)    Stage 2 Mild CKD (GFR = 60-89 mL/min/1 73 square meters)    Stage 3A Moderate CKD (GFR = 45-59 mL/min/1 73 square meters)    Stage 3B Moderate CKD (GFR = 30-44 mL/min/1 73 square meters)    Stage 4 Severe CKD (GFR = 15-29 mL/min/1 73 square meters)    Stage 5 End Stage CKD (GFR <15 mL/min/1 73 square meters)  Note: GFR calculation is accurate only with a steady state creatinine    Lipase [[id number]]  (Normal) Collected:  02/03/20 1807    Lab Status:  Final result Specimen:  Blood from Arm, Left Updated:  02/03/20 1835     Lipase 68 u/L     UA w Reflex to Microscopic w Reflex to Culture [[id number]]  (Abnormal) Collected:  02/03/20 1805    Lab Status:  Final result Specimen:  Urine, Clean Catch Updated:  02/03/20 1823     Color, UA Straw     Clarity, UA Clear     Specific Gravity, UA <=1 005     pH, UA 6 0     Leukocytes, UA Negative     Nitrite, UA Negative     Protein, UA Negative mg/dl      Glucose, UA Negative mg/dl      Ketones, UA Negative mg/dl      Urobilinogen, UA 0 2 E U /dl      Bilirubin, UA Negative     Blood, UA Negative    CBC and differential [[id number]]  (Abnormal) Collected:  02/03/20 1807    Lab Status:  Final result Specimen:  Blood from Arm, Left Updated:  02/03/20 1818     WBC 9 00 Thousand/uL      RBC 4 44 Million/uL      Hemoglobin 9 9 g/dL      Hematocrit 32 1 %      MCV 72 fL      MCH 22 4 pg      MCHC 30 9 g/dL      RDW 17 4 %      MPV 6 4 fL      Platelets 424 Thousands/uL      Neutrophils Relative 67 %      Lymphocytes Relative 22 %      Monocytes Relative 9 %      Eosinophils Relative 0 %      Basophils Relative 2 %      Neutrophils Absolute 6 00 Thousands/µL      Lymphocytes Absolute 2 00 Thousands/µL      Monocytes Absolute 0 80 Thousand/µL      Eosinophils Absolute 0 00 Thousand/µL      Basophils Absolute 0 20 Thousands/µL                  CT abdomen pelvis with contrast    (Results Pending)              Procedures  Procedures         ED Course  ED Course as of Feb 03 1910   Mon Feb 03, 2020   1828 Pt care is being transfer to Dr Nurys Cerrato                                  MDM      Disposition  Final diagnoses:   Left flank pain   Abdominal distention   Acute anemia     Time reflects when diagnosis was documented in both MDM as applicable and the Disposition within this note     Time User Action Codes Description Comment    2/3/2020  6:28 PM Katty Genin Add [R10 9] Left flank pain     2/3/2020  6:28 PM Katty Genin Add [R14 0] Abdominal distention     2/3/2020  6:29 PM Katty Genin Add [D64 9] Acute anemia       ED Disposition     None      Follow-up Information    None         Patient's Medications   Discharge Prescriptions    No medications on file     No discharge procedures on file      ED Provider  Electronically Signed by           Yonathan Hubbard MD  02/03/20 1378

## 2020-02-04 NOTE — DISCHARGE INSTRUCTIONS
RETURN IF WORSE IN ANY WAY:   CHEST PAIN, SHORTNESS OF BREATH, INCREASED PAIN, FEVER OR FLU LIKE SYMPTOMS, OR NEW AND CONCERNING SYMPTOMS SIGNS OR SYMPTOMS:    PLEASE CALL GI and YOUR PRIMARY DOCTOR IN THE MORNING TO SET UP FOLLOW UP   PLEASE REVIEW THE WORK UP RESULTS WITH YOUR DOCTOR

## 2020-02-04 NOTE — ED NOTES
Patient requested to speak to MD - MD present in room to do rectal exam     Olga Marrero RN  02/03/20 5017

## 2020-02-04 NOTE — ED CARE HANDOFF
Emergency Department Sign Out Note        Sign out and transfer of care from Dr Marie Espinosa  See Separate Emergency Department note  The patient, Emigdio Bevrely, was evaluated by the previous provider for Left flank pain  Workup Completed:  Labs    ED Course / Workup Pending (followup):    1930  Received in signout  hbg dropped since last year  Otherwise labs and vs unremarkable    2100  Pt and spouse understand results  CT w/o defnitive dx of pt's symptoms    Pt feels much much better after toradol    Hemoccult negative, done by myself    Pt understands return and f/u instructions  Pt will f/u w/ PCP and GI                             Procedures  MDM    Disposition  Final diagnoses:   Left flank pain   Abdominal distention   Acute anemia     Time reflects when diagnosis was documented in both MDM as applicable and the Disposition within this note     Time User Action Codes Description Comment    2/3/2020  6:28 PM [place] Ch[place] Add [R10 9] Left flank pain     2/3/2020  6:28 PM [place] Chall[place] Add [R14 0] Abdominal distention     2/3/2020  6:29 PM [place] Challenger Add [D64 9] Acute anemia       ED Disposition     None      Follow-up Information    None       Patient's Medications   Discharge Prescriptions    No medications on file     No discharge procedures on file         ED Provider  Electronically Signed by     Janes Encinas MD  02/03/20 1650

## 2020-02-04 NOTE — ED CARE HANDOFF
Emergency Department Sign Out Note        Sign out and transfer of care from Dr Marlene Patel  See Separate Emergency Department note  The patient, Donny Santacruz, was evaluated by the previous provider for Flank pain    Workup Completed:  Labs reviewed  Drop in hbg from approx 1 year ago - uncertain significance      ED Course / Workup Pending (followup):    2014  Ct scan:   CT ABDOMEN AND PELVIS WITH IV CONTRAST     INDICATION:   Abdominal pain, acute, nonlocalized      COMPARISON:  None      FINDINGS:  ABDOMEN  LOWER CHEST:  No clinically significant abnormality identified in the visualized lower chest   LIVER/BILIARY TREE:  Unremarkable  GALLBLADDER:  No calcified gallstones  No pericholecystic inflammatory change  SPLEEN:  Unremarkable  PANCREAS:  Unremarkable  ADRENAL GLANDS:  Unremarkable  KIDNEYS/URETERS:  Unremarkable  No hydronephrosis  STOMACH AND BOWEL:  There is colonic diverticulosis without evidence of acute diverticulitis  APPENDIX:  No findings to suggest appendicitis  ABDOMINOPELVIC CAVITY:  No ascites or free intraperitoneal air  No lymphadenopathy  VESSELS:  Unremarkable for patient's age  PELVIS   REPRODUCTIVE ORGANS:  The prostate is enlarged  URINARY BLADDER:  Diffuse bladder wall thickening could relate to outlet obstruction or cystitis, correlate with urinalysis  ABDOMINAL WALL/INGUINAL REGIONS:  Medial right inguinal fatty infiltration could relate to infectious/inflammatory etiology anterior to the pelvic symphysis on the right side, no fluid collection  Correlate clinically  OSSEOUS STRUCTURES:  No acute fracture or destructive osseous lesion  IMPRESSION:  1  Prostatomegaly with diffuse bladder wall thickening which could relate to outlet obstruction or cystitis, correlate with urinalysis  2  Medial right inguinal fatty infiltration could relate to infectious/inflammatory etiology anterior to the pelvic symphysis on the right side, no fluid collection   Correlate clinically  3  Colonic diverticulosis           2040   patient has a normal rectal exam   Hemoccult negative  Will discharge patient with GI follow-up  Patient is happy with this plan, overall very happy with his ER care, rated manage from                     Procedures  MDM    Disposition  Final diagnoses:   Left flank pain   Abdominal distention   Acute anemia     Time reflects when diagnosis was documented in both MDM as applicable and the Disposition within this note     Time User Action Codes Description Comment    2/3/2020  6:28 PM Janice McCarley Add [R10 9] Left flank pain     2/3/2020  6:28 PM Janice McCarley Add [R14 0] Abdominal distention     2/3/2020  6:29 PM Janice McCarley Add [D64 9] Acute anemia       ED Disposition     None      Follow-up Information    None       Patient's Medications   Discharge Prescriptions    No medications on file     No discharge procedures on file         ED Provider  Electronically Signed by     Pancho Whiting MD  02/04/20 2811

## 2020-02-22 ENCOUNTER — APPOINTMENT (EMERGENCY)
Dept: RADIOLOGY | Facility: HOSPITAL | Age: 65
End: 2020-02-22
Payer: MEDICARE

## 2020-02-22 ENCOUNTER — HOSPITAL ENCOUNTER (EMERGENCY)
Facility: HOSPITAL | Age: 65
Discharge: HOME | End: 2020-02-22
Attending: EMERGENCY MEDICINE | Admitting: EMERGENCY MEDICINE
Payer: MEDICARE

## 2020-02-22 ENCOUNTER — APPOINTMENT (EMERGENCY)
Dept: CT IMAGING | Facility: HOSPITAL | Age: 65
End: 2020-02-22
Payer: MEDICARE

## 2020-02-22 VITALS
DIASTOLIC BLOOD PRESSURE: 70 MMHG | RESPIRATION RATE: 18 BRPM | HEIGHT: 70 IN | TEMPERATURE: 98.7 F | OXYGEN SATURATION: 98 % | HEART RATE: 85 BPM | WEIGHT: 160 LBS | BODY MASS INDEX: 22.9 KG/M2 | SYSTOLIC BLOOD PRESSURE: 118 MMHG

## 2020-02-22 DIAGNOSIS — R10.13 EPIGASTRIC PAIN: Primary | ICD-10-CM

## 2020-02-22 LAB
ALBUMIN SERPL BCP-MCNC: 4.3 G/DL (ref 3.5–5.7)
ALP SERPL-CCNC: 57 U/L (ref 55–165)
ALT SERPL W P-5'-P-CCNC: 16 U/L (ref 7–52)
ANION GAP SERPL CALCULATED.3IONS-SCNC: 9 MMOL/L (ref 4–13)
AST SERPL W P-5'-P-CCNC: 22 U/L (ref 13–39)
BACTERIA UR QL AUTO: ABNORMAL /HPF
BASOPHILS # BLD AUTO: 0.1 THOUSANDS/ΜL (ref 0–0.1)
BASOPHILS NFR BLD AUTO: 1 % (ref 0–2)
BILIRUB SERPL-MCNC: 0.5 MG/DL (ref 0.2–1)
BILIRUB UR QL STRIP: NEGATIVE
BUN SERPL-MCNC: 18 MG/DL (ref 7–25)
CALCIUM SERPL-MCNC: 9.5 MG/DL (ref 8.6–10.5)
CHLORIDE SERPL-SCNC: 102 MMOL/L (ref 98–107)
CLARITY UR: CLEAR
CO2 SERPL-SCNC: 25 MMOL/L (ref 21–31)
COLOR UR: YELLOW
CREAT SERPL-MCNC: 1.31 MG/DL (ref 0.7–1.3)
EOSINOPHIL # BLD AUTO: 0.1 THOUSAND/ΜL (ref 0–0.61)
EOSINOPHIL NFR BLD AUTO: 2 % (ref 0–5)
ERYTHROCYTE [DISTWIDTH] IN BLOOD BY AUTOMATED COUNT: 19.5 % (ref 11.5–14.5)
GFR SERPL CREATININE-BSD FRML MDRD: 56 ML/MIN/1.73SQ M
GLUCOSE SERPL-MCNC: 74 MG/DL (ref 65–99)
GLUCOSE UR STRIP-MCNC: NEGATIVE MG/DL
HCT VFR BLD AUTO: 34.5 % (ref 42–47)
HGB BLD-MCNC: 10.7 G/DL (ref 14–18)
HGB UR QL STRIP.AUTO: NEGATIVE
HYALINE CASTS #/AREA URNS LPF: ABNORMAL /LPF
KETONES UR STRIP-MCNC: NEGATIVE MG/DL
LEUKOCYTE ESTERASE UR QL STRIP: ABNORMAL
LIPASE SERPL-CCNC: 31 U/L (ref 11–82)
LYMPHOCYTES # BLD AUTO: 1.3 THOUSANDS/ΜL (ref 0.6–4.47)
LYMPHOCYTES NFR BLD AUTO: 18 % (ref 21–51)
MCH RBC QN AUTO: 23 PG (ref 26–34)
MCHC RBC AUTO-ENTMCNC: 31 G/DL (ref 31–37)
MCV RBC AUTO: 74 FL (ref 81–99)
MICROCYTES BLD QL AUTO: PRESENT
MONOCYTES # BLD AUTO: 0.9 THOUSAND/ΜL (ref 0.17–1.22)
MONOCYTES NFR BLD AUTO: 12 % (ref 2–12)
MUCOUS THREADS UR QL AUTO: ABNORMAL
NEUTROPHILS # BLD AUTO: 5 THOUSANDS/ΜL (ref 1.4–6.5)
NEUTS SEG NFR BLD AUTO: 69 % (ref 42–75)
NITRITE UR QL STRIP: NEGATIVE
NON-SQ EPI CELLS URNS QL MICRO: ABNORMAL /HPF
PH UR STRIP.AUTO: 6 [PH]
PLATELET # BLD AUTO: 322 THOUSANDS/UL (ref 149–390)
PLATELET BLD QL SMEAR: ADEQUATE
PMV BLD AUTO: 6.6 FL (ref 8.6–11.7)
POTASSIUM SERPL-SCNC: 4.1 MMOL/L (ref 3.5–5.5)
PROT SERPL-MCNC: 7.7 G/DL (ref 6.4–8.9)
PROT UR STRIP-MCNC: NEGATIVE MG/DL
RBC # BLD AUTO: 4.64 MILLION/UL (ref 4.3–5.9)
RBC #/AREA URNS AUTO: ABNORMAL /HPF
RBC MORPH BLD: PRESENT
SODIUM SERPL-SCNC: 136 MMOL/L (ref 134–143)
SP GR UR STRIP.AUTO: 1.01 (ref 1–1.03)
TROPONIN I SERPL-MCNC: <0.03 NG/ML
UROBILINOGEN UR QL STRIP.AUTO: 0.2 E.U./DL
WBC # BLD AUTO: 7.4 THOUSAND/UL (ref 4.8–10.8)
WBC #/AREA URNS AUTO: ABNORMAL /HPF

## 2020-02-22 PROCEDURE — 93005 ELECTROCARDIOGRAM TRACING: CPT

## 2020-02-22 PROCEDURE — 81001 URINALYSIS AUTO W/SCOPE: CPT | Performed by: EMERGENCY MEDICINE

## 2020-02-22 PROCEDURE — 84484 ASSAY OF TROPONIN QUANT: CPT | Performed by: EMERGENCY MEDICINE

## 2020-02-22 PROCEDURE — 80053 COMPREHEN METABOLIC PANEL: CPT | Performed by: EMERGENCY MEDICINE

## 2020-02-22 PROCEDURE — 71046 X-RAY EXAM CHEST 2 VIEWS: CPT

## 2020-02-22 PROCEDURE — 74176 CT ABD & PELVIS W/O CONTRAST: CPT

## 2020-02-22 PROCEDURE — 99284 EMERGENCY DEPT VISIT MOD MDM: CPT

## 2020-02-22 PROCEDURE — 83690 ASSAY OF LIPASE: CPT | Performed by: EMERGENCY MEDICINE

## 2020-02-22 PROCEDURE — 99283 EMERGENCY DEPT VISIT LOW MDM: CPT | Performed by: EMERGENCY MEDICINE

## 2020-02-22 PROCEDURE — 85025 COMPLETE CBC W/AUTO DIFF WBC: CPT | Performed by: EMERGENCY MEDICINE

## 2020-02-22 PROCEDURE — 36415 COLL VENOUS BLD VENIPUNCTURE: CPT | Performed by: EMERGENCY MEDICINE

## 2020-02-22 RX ORDER — LIDOCAINE HYDROCHLORIDE 20 MG/ML
15 SOLUTION OROPHARYNGEAL ONCE
Status: COMPLETED | OUTPATIENT
Start: 2020-02-22 | End: 2020-02-22

## 2020-02-22 RX ORDER — MAGNESIUM HYDROXIDE/ALUMINUM HYDROXICE/SIMETHICONE 120; 1200; 1200 MG/30ML; MG/30ML; MG/30ML
30 SUSPENSION ORAL ONCE
Status: COMPLETED | OUTPATIENT
Start: 2020-02-22 | End: 2020-02-22

## 2020-02-22 RX ORDER — FAMOTIDINE 20 MG/1
40 TABLET, FILM COATED ORAL ONCE
Status: COMPLETED | OUTPATIENT
Start: 2020-02-22 | End: 2020-02-22

## 2020-02-22 RX ADMIN — LIDOCAINE HYDROCHLORIDE 15 ML: 20 SOLUTION ORAL; TOPICAL at 12:18

## 2020-02-22 RX ADMIN — FAMOTIDINE 40 MG: 20 TABLET ORAL at 12:18

## 2020-02-22 RX ADMIN — ALUMINUM HYDROXIDE, MAGNESIUM HYDROXIDE, AND SIMETHICONE 30 ML: 200; 200; 20 SUSPENSION ORAL at 12:17

## 2020-02-22 NOTE — ED PROVIDER NOTES
Emergency Department Note    Encounter Date 2/22/2020    Diagnosis  1  Epigastric pain        MDM  Number of Diagnoses or Management Options  Epigastric pain:   Diagnosis management comments: Presents with abdominal pain  Afebrile  VSS  Exam reveals nontoxic appearing patient in no acute distress with fairly rotund somewhat firm abdomen and very faint old ecchymoses left anterior abdominal wall  Labs reveal no clinically significant change from baseline  Troponin WNL  UA unremarkable  ECG reveals normal sinus rhythm, LAD, RBBB, no acute ischemic changes  Diagnostic imaging reveals no acute pulmonary process and no acute intra-abdominal pelvic process to explain the patient's symptoms other than acid reflux, GERD, esophagitis  Otherwise, no clear etiology time of this report especially concerning the this of the left-sided abdominal wall  Gave viscous lidocaine, Maalox with simethicone and Pepcid  Okay for discharge home with instructions for follow-up and signs and symptoms that would warrant return to the emergency department  CC  Chief Complaint   Patient presents with    Abdominal Pain     left side abd pain, bruising noted today  pt has seen GI and is for outpatient EDG and colonoscopy       PMH significant for HTN, GERD, DM, COPD/asthma complains of gradual onset burning sometimes crampy middle epigastric and left-sided abdominal pain of fluctuating intensity that sometimes radiates the burning pain up into his chest worse at night and after eating certain foods over the past 21 days or so, seen in this emergency department with essentially negative/unremarkable workup not long after onset  Denies fever, rash, joint pain/swelling, headache, vision changes, hearing changes, chest pain, shortness of breath and changes in bladder habits  History  No current facility-administered medications for this encounter       Current Outpatient Medications:     aspirin 81 mg chewable tablet, Chew 81 mg, Disp: , Rfl:     gabapentin (NEURONTIN) 300 mg capsule, Take 300 mg by mouth, Disp: , Rfl:     glipiZIDE (GLUCOTROL) 5 mg tablet, 1 tab with dinner , Disp: , Rfl:     lisinopril (ZESTRIL) 40 mg tablet, Take 40 mg by mouth, Disp: , Rfl:     metFORMIN (GLUCOPHAGE) 500 mg tablet, Take 500 mg by mouth, Disp: , Rfl:     metoprolol succinate (TOPROL-XL) 25 mg 24 hr tablet, Take 25 mg by mouth, Disp: , Rfl:     pantoprazole (PROTONIX) 40 mg tablet, take 1 tablet by mouth once daily, Disp: , Rfl:     sertraline (ZOLOFT) 50 mg tablet, 1 5 tab daily  , Disp: , Rfl:     simvastatin (ZOCOR) 40 mg tablet, Take 40 mg by mouth, Disp: , Rfl:     tobramycin-dexamethasone (TOBRADEX) ophthalmic suspension, Administer 1 drop into the left eye every 4 (four) hours while awake for 7 days, Disp: 1 75 mL, Rfl: 0     Past Medical History:   Diagnosis Date    Asthma     Diabetes mellitus ([facility] [address] )     GERD (gastroesophageal reflux disease)     Hypertension        Past Surgical History:   Procedure Laterality Date    HERNIA REPAIR         History reviewed  No pertinent family history       Social History     Socioeconomic History    Marital status: /Civil Union     Spouse name: Not on file    Number of children: Not on file    Years of education: Not on file    Highest education level: Not on file   Occupational History    Not on file   Social Needs    Financial resource strain: Not on file    Food insecurity:     Worry: Not on file     Inability: Not on file    Transportation needs:     Medical: Not on file     Non-medical: Not on file   Tobacco Use    Smoking status: Heavy Tobacco Smoker     Packs/day: 0 50    Smokeless tobacco: Never Used   Substance and Sexual Activity    Alcohol use: Yes     Comment: occassionally     Drug use: No    Sexual activity: Not on file   Lifestyle    Physical activity:     Days per week: Not on file     Minutes per session: Not on file    Stress: Not on file Relationships    Social connections:     Talks on phone: Not on file     Gets together: Not on file     Attends Jainism service: Not on file     Active member of club or organization: Not on file     Attends meetings of clubs or organizations: Not on file     Relationship status: Not on file    Intimate partner violence:     Fear of current or ex partner: Not on file     Emotionally abused: Not on file     Physically abused: Not on file     Forced sexual activity: Not on file   Other Topics Concern    Not on file   Social History Narrative    Not on file       Review of Systems   All other systems reviewed and are negative  Vital Signs  Vitals:    02/22/20 1138   BP: 112/72   TempSrc: Tympanic   Pulse: 103   Resp: 18   Patient Position - Orthostatic VS: Sitting   Temp: 98 7 °F (37 1 °C)       Physical Exam   Constitutional: He appears well-developed and well-nourished  HENT:   Head: Normocephalic and atraumatic  Eyes: Conjunctivae and EOM are normal    Neck: Normal range of motion  Neck supple  Cardiovascular: Normal rate and regular rhythm  Pulmonary/Chest: Effort normal and breath sounds normal    Abdominal:   Fairly rotund somewhat firm abdomen and very faint old ecchymoses left anterior abdominal wall   Musculoskeletal: Normal range of motion  He exhibits no deformity  Neurological: He is alert  No focal deficit   Skin: Skin is warm and dry  Psychiatric: He has a normal mood and affect  His behavior is normal    Nursing note and vitals reviewed         ED Medications  Medications   famotidine (PEPCID) tablet 40 mg (40 mg Oral Given 2/22/20 1218)   Lidocaine Viscous HCl (XYLOCAINE) 2 % mucosal solution 15 mL (15 mL Swish & Spit Given 2/22/20 1218)   aluminum-magnesium hydroxide-simethicone (MYLANTA) 200-200-20 mg/5 mL oral suspension 30 mL (30 mL Oral Given 2/22/20 1217)       Labs  Labs Reviewed   COMPREHENSIVE METABOLIC PANEL - Abnormal       Result Value Ref Range Status    Sodium 136 134 - 143 mmol/L Final    Potassium 4 1  3 5 - 5 5 mmol/L Final    Chloride 102  98 - 107 mmol/L Final    CO2 25  21 - 31 mmol/L Final    ANION GAP 9  4 - 13 mmol/L Final    BUN 18  7 - 25 mg/dL Final    Creatinine 1 31 (*) 0 70 - 1 30 mg/dL Final    Comment: Standardized to IDMS reference method    Glucose 74  65 - 99 mg/dL Final    Comment:   If the patient is fasting, the ADA then defines impaired fasting glucose as > 100 mg/dL and diabetes as > or equal to 123 mg/dL  Specimen collection should occur prior to Sulfasalazine administration due to the potential for falsely depressed results  Specimen collection should occur prior to Sulfapyridine administration due to the potential for falsely elevated results  Calcium 9 5  8 6 - 10 5 mg/dL Final    AST 22  13 - 39 U/L Final    Comment:   Specimen collection should occur prior to Sulfasalazine administration due to the potential for falsely depressed results  ALT 16  7 - 52 U/L Final    Comment:   Specimen collection should occur prior to Sulfasalazine administration due to the potential for falsely depressed results       Alkaline Phosphatase 57  55 - 165 U/L Final    Total Protein 7 7  6 4 - 8 9 g/dL Final    Albumin 4 3  3 5 - 5 7 g/dL Final    Total Bilirubin 0 50  0 20 - 1 00 mg/dL Final    eGFR 56  ml/min/1 73sq m Final    Narrative:     [place] guidelines for Chronic Kidney Disease (CKD):     Stage 1 with normal or high GFR (GFR > 90 mL/min/1 73 square meters)    Stage 2 Mild CKD (GFR = 60-89 mL/min/1 73 square meters)    Stage 3A Moderate CKD (GFR = 45-59 mL/min/1 73 square meters)    Stage 3B Moderate CKD (GFR = 30-44 mL/min/1 73 square meters)    Stage 4 Severe CKD (GFR = 15-29 mL/min/1 73 square meters)    Stage 5 End Stage CKD (GFR <15 mL/min/1 73 square meters)  Note: GFR calculation is accurate only with a steady state creatinine   CBC AND DIFFERENTIAL - Abnormal    WBC 7 40  4 80 - 10 80 Thousand/uL Final RBC 4 64  4 30 - 5 90 Million/uL Final    Hemoglobin 10 7 (*) 14 0 - 18 0 g/dL Final    Hematocrit 34 5 (*) 42 0 - 47 0 % Final    MCV 74 (*) 81 - 99 fL Final    MCH 23 0 (*) 26 0 - 34 0 pg Final    MCHC 31 0  31 0 - 37 0 g/dL Final    RDW 19 5 (*) 11 5 - 14 5 % Final    MPV 6 6 (*) 8 6 - 11 7 fL Final    Platelets 259  869 - 390 Thousands/uL Final    Neutrophils Relative 69  42 - 75 % Final    Lymphocytes Relative 18 (*) 21 - 51 % Final    Monocytes Relative 12  2 - 12 % Final    Eosinophils Relative 2  0 - 5 % Final    Basophils Relative 1  0 - 2 % Final    Neutrophils Absolute 5 00  1 40 - 6 50 Thousands/µL Final    Lymphocytes Absolute 1 30  0 60 - 4 47 Thousands/µL Final    Monocytes Absolute 0 90  0 17 - 1 22 Thousand/µL Final    Eosinophils Absolute 0 10  0 00 - 0 61 Thousand/µL Final    Basophils Absolute 0 10  0 00 - 0 10 Thousands/µL Final   UA W REFLEX TO MICROSCOPIC WITH REFLEX TO CULTURE - Abnormal    Color, UA Yellow  Yellow, Straw Final    Clarity, UA Clear  Hazy, Clear Final    Specific Gravity, UA 1 010  >1 005-<1 030 Final    pH, UA 6 0  5 0, 5 5, 6 0, 6 5, 7 0, 7 5 Final    Leukocytes, UA Trace (*) Negative Final    Nitrite, UA Negative  Negative Final    Protein, UA Negative  Negative, Interference- unable to analyze mg/dl Final    Glucose, UA Negative  Negative mg/dl Final    Ketones, UA Negative  Negative mg/dl Final    Urobilinogen, UA 0 2  0 2, 1 0 E U /dl E U /dl Final    Bilirubin, UA Negative  Negative Final    Blood, UA Negative  Negative Final   URINE MICROSCOPIC - Abnormal    RBC, UA 2-4 (*) None Seen, 0-5 /hpf Final    WBC, UA 2-4 (*) None Seen, 0-5, 5-55, 5-65 /hpf Final    Epithelial Cells Occasional  None Seen, Occasional /hpf Final    Bacteria, UA Occasional  None Seen, Occasional /hpf Final    Hyaline Casts, UA 2-4 (*) (none) /lpf Final    MUCUS THREADS Occasional (*) None Seen Final   LIPASE - Normal    Lipase 31  11 - 82 u/L Final   TROPONIN I - Normal    Troponin I <0 03 <=0 03 ng/mL Final   SMEAR REVIEW(PHLEBS DO NOT ORDER)    RBC Morphology Present   Final    Microcytes Present   Final    Platelet Estimate Adequate  Adequate Final        ECG  Normal sinus rhythm, LAD, RBBB, no acute ischemic changes  Imaging  XR chest 2 views   Final Result by Martha Aponte MD (02/22 1336)      No acute cardiopulmonary disease  Workstation performed: [id number]         CT abdomen pelvis wo contrast   Final Result by Heaven Adams MD (02/22 1318)      No calculi identified  No acute abnormality within the abdomen or pelvis  Workstation performed: [id number]             Procedures   None  ED Course        Disposition  Time reflects when diagnosis was documented in both MDM as applicable and the Disposition within this note     Time User Action Codes Description Comment    2/22/2020  1:53 PM Milagros De Oliveira Add [R10 13] Epigastric pain       ED Disposition     ED Disposition Condition Date/Time Comment    Discharge Good Sat Feb 22, 2020  1:53 PM Luz Elena Ortega discharge to home/self care  Follow-up Information     Follow up With Specialties Details Why Contact Info    Your primary care provider  Call in 2 days To schedule an appointment for re-evaluation    If you do not have a primary care provider please ask your nurse for list of doctors from whom you can choose            ED Provider  Electronically signed by:     Raghu Polo DO  02/22/20 3958

## 2020-02-23 LAB
ATRIAL RATE: 87 BPM
P AXIS: 66 DEGREES
PR INTERVAL: 154 MS
QRS AXIS: -87 DEGREES
QRSD INTERVAL: 120 MS
QT INTERVAL: 370 MS
QTC INTERVAL: 445 MS
T WAVE AXIS: 53 DEGREES
VENTRICULAR RATE: 87 BPM

## 2020-02-23 PROCEDURE — 93010 ELECTROCARDIOGRAM REPORT: CPT | Performed by: INTERNAL MEDICINE

## 2020-03-23 ENCOUNTER — APPOINTMENT (EMERGENCY)
Dept: CT IMAGING | Facility: HOSPITAL | Age: 65
DRG: 392 | End: 2020-03-23
Payer: MEDICARE

## 2020-03-23 ENCOUNTER — HOSPITAL ENCOUNTER (INPATIENT)
Facility: HOSPITAL | Age: 65
LOS: 1 days | DRG: 392 | End: 2020-03-24
Attending: EMERGENCY MEDICINE | Admitting: INTERNAL MEDICINE
Payer: MEDICARE

## 2020-03-23 DIAGNOSIS — K21.9 GASTROESOPHAGEAL REFLUX DISEASE, ESOPHAGITIS PRESENCE NOT SPECIFIED: ICD-10-CM

## 2020-03-23 DIAGNOSIS — R10.12 LEFT UPPER QUADRANT PAIN: ICD-10-CM

## 2020-03-23 DIAGNOSIS — R07.9 CHEST PAIN: Primary | ICD-10-CM

## 2020-03-23 LAB
ALBUMIN SERPL BCP-MCNC: 4.2 G/DL (ref 3.5–5.7)
ALP SERPL-CCNC: 49 U/L (ref 55–165)
ALT SERPL W P-5'-P-CCNC: 20 U/L (ref 7–52)
ANION GAP SERPL CALCULATED.3IONS-SCNC: 8 MMOL/L (ref 4–13)
AST SERPL W P-5'-P-CCNC: 31 U/L (ref 13–39)
BASOPHILS # BLD AUTO: 0 THOUSANDS/ΜL (ref 0–0.1)
BASOPHILS NFR BLD AUTO: 1 % (ref 0–2)
BILIRUB SERPL-MCNC: 0.2 MG/DL (ref 0.2–1)
BUN SERPL-MCNC: 22 MG/DL (ref 7–25)
CALCIUM SERPL-MCNC: 8.8 MG/DL (ref 8.6–10.5)
CHLORIDE SERPL-SCNC: 104 MMOL/L (ref 98–107)
CO2 SERPL-SCNC: 26 MMOL/L (ref 21–31)
CREAT SERPL-MCNC: 1.37 MG/DL (ref 0.7–1.3)
EOSINOPHIL # BLD AUTO: 0.1 THOUSAND/ΜL (ref 0–0.61)
EOSINOPHIL NFR BLD AUTO: 1 % (ref 0–5)
ERYTHROCYTE [DISTWIDTH] IN BLOOD BY AUTOMATED COUNT: 20.6 % (ref 11.5–14.5)
GFR SERPL CREATININE-BSD FRML MDRD: 53 ML/MIN/1.73SQ M
GLUCOSE SERPL-MCNC: 150 MG/DL (ref 65–99)
HCT VFR BLD AUTO: 34.2 % (ref 42–47)
HGB BLD-MCNC: 10.7 G/DL (ref 14–18)
LIPASE SERPL-CCNC: 75 U/L (ref 11–82)
LYMPHOCYTES # BLD AUTO: 2.3 THOUSANDS/ΜL (ref 0.6–4.47)
LYMPHOCYTES NFR BLD AUTO: 28 % (ref 21–51)
MAGNESIUM SERPL-MCNC: 2.7 MG/DL (ref 1.9–2.7)
MCH RBC QN AUTO: 23.7 PG (ref 26–34)
MCHC RBC AUTO-ENTMCNC: 31.2 G/DL (ref 31–37)
MCV RBC AUTO: 76 FL (ref 81–99)
MONOCYTES # BLD AUTO: 0.7 THOUSAND/ΜL (ref 0.17–1.22)
MONOCYTES NFR BLD AUTO: 9 % (ref 2–12)
NEUTROPHILS # BLD AUTO: 5.1 THOUSANDS/ΜL (ref 1.4–6.5)
NEUTS SEG NFR BLD AUTO: 62 % (ref 42–75)
PLATELET # BLD AUTO: 288 THOUSANDS/UL (ref 149–390)
PMV BLD AUTO: 7.1 FL (ref 8.6–11.7)
POTASSIUM SERPL-SCNC: 4.7 MMOL/L (ref 3.5–5.5)
PROT SERPL-MCNC: 6.7 G/DL (ref 6.4–8.9)
RBC # BLD AUTO: 4.51 MILLION/UL (ref 4.3–5.9)
SODIUM SERPL-SCNC: 138 MMOL/L (ref 134–143)
TROPONIN I SERPL-MCNC: <0.03 NG/ML
WBC # BLD AUTO: 8.3 THOUSAND/UL (ref 4.8–10.8)

## 2020-03-23 PROCEDURE — 71275 CT ANGIOGRAPHY CHEST: CPT

## 2020-03-23 PROCEDURE — 99285 EMERGENCY DEPT VISIT HI MDM: CPT

## 2020-03-23 PROCEDURE — 80053 COMPREHEN METABOLIC PANEL: CPT | Performed by: EMERGENCY MEDICINE

## 2020-03-23 PROCEDURE — 36415 COLL VENOUS BLD VENIPUNCTURE: CPT | Performed by: EMERGENCY MEDICINE

## 2020-03-23 PROCEDURE — 83735 ASSAY OF MAGNESIUM: CPT | Performed by: EMERGENCY MEDICINE

## 2020-03-23 PROCEDURE — 96361 HYDRATE IV INFUSION ADD-ON: CPT

## 2020-03-23 PROCEDURE — 83690 ASSAY OF LIPASE: CPT | Performed by: EMERGENCY MEDICINE

## 2020-03-23 PROCEDURE — 99285 EMERGENCY DEPT VISIT HI MDM: CPT | Performed by: EMERGENCY MEDICINE

## 2020-03-23 PROCEDURE — 93005 ELECTROCARDIOGRAM TRACING: CPT

## 2020-03-23 PROCEDURE — 84484 ASSAY OF TROPONIN QUANT: CPT | Performed by: EMERGENCY MEDICINE

## 2020-03-23 PROCEDURE — 85025 COMPLETE CBC W/AUTO DIFF WBC: CPT | Performed by: EMERGENCY MEDICINE

## 2020-03-23 PROCEDURE — 96374 THER/PROPH/DIAG INJ IV PUSH: CPT

## 2020-03-23 PROCEDURE — 74177 CT ABD & PELVIS W/CONTRAST: CPT

## 2020-03-23 RX ORDER — MORPHINE SULFATE 4 MG/ML
4 INJECTION, SOLUTION INTRAMUSCULAR; INTRAVENOUS ONCE
Status: COMPLETED | OUTPATIENT
Start: 2020-03-23 | End: 2020-03-23

## 2020-03-23 RX ADMIN — SODIUM CHLORIDE 1000 ML: 0.9 INJECTION, SOLUTION INTRAVENOUS at 23:15

## 2020-03-23 RX ADMIN — MORPHINE SULFATE 4 MG: 4 INJECTION INTRAVENOUS at 23:15

## 2020-03-24 ENCOUNTER — APPOINTMENT (INPATIENT)
Dept: MRI IMAGING | Facility: HOSPITAL | Age: 65
DRG: 392 | End: 2020-03-24
Payer: MEDICARE

## 2020-03-24 ENCOUNTER — HOSPITAL ENCOUNTER (INPATIENT)
Facility: HOSPITAL | Age: 65
LOS: 3 days | Discharge: HOME | DRG: 376 | End: 2020-03-27
Attending: INTERNAL MEDICINE | Admitting: INTERNAL MEDICINE
Payer: MEDICARE

## 2020-03-24 VITALS
DIASTOLIC BLOOD PRESSURE: 83 MMHG | SYSTOLIC BLOOD PRESSURE: 142 MMHG | TEMPERATURE: 99.9 F | OXYGEN SATURATION: 92 % | WEIGHT: 203.04 LBS | HEART RATE: 76 BPM | RESPIRATION RATE: 18 BRPM | HEIGHT: 70 IN | BODY MASS INDEX: 29.07 KG/M2

## 2020-03-24 DIAGNOSIS — K31.89 GASTRIC MASS: Primary | ICD-10-CM

## 2020-03-24 DIAGNOSIS — R10.12 LEFT UPPER QUADRANT PAIN: ICD-10-CM

## 2020-03-24 DIAGNOSIS — K59.00 CONSTIPATION: ICD-10-CM

## 2020-03-24 PROBLEM — N18.30 STAGE 3 CHRONIC KIDNEY DISEASE (HCC): Status: ACTIVE | Noted: 2020-03-24

## 2020-03-24 PROBLEM — R07.89 OTHER CHEST PAIN: Status: ACTIVE | Noted: 2020-03-24

## 2020-03-24 PROBLEM — R10.9 ABDOMINAL PAIN: Status: ACTIVE | Noted: 2020-03-24

## 2020-03-24 PROBLEM — Z72.0 TOBACCO ABUSE: Chronic | Status: ACTIVE | Noted: 2020-03-24

## 2020-03-24 PROBLEM — D50.9 IRON DEFICIENCY ANEMIA: Status: ACTIVE | Noted: 2020-03-24

## 2020-03-24 PROBLEM — J30.89 NON-SEASONAL ALLERGIC RHINITIS DUE TO FUNGAL SPORES: Status: ACTIVE | Noted: 2019-04-29

## 2020-03-24 PROBLEM — A04.5 CAMPYLOBACTER DIARRHEA: Status: RESOLVED | Noted: 2018-06-22 | Resolved: 2020-03-24

## 2020-03-24 PROBLEM — A04.5 CAMPYLOBACTER DIARRHEA: Status: ACTIVE | Noted: 2018-06-22

## 2020-03-24 PROBLEM — E11.9 DIABETES MELLITUS TYPE 2, CONTROLLED (HCC): Status: ACTIVE | Noted: 2018-01-11

## 2020-03-24 LAB
ANION GAP SERPL CALCULATED.3IONS-SCNC: 5 MMOL/L (ref 4–13)
ANISOCYTOSIS BLD QL SMEAR: PRESENT
ATRIAL RATE: 72 BPM
BILIRUB UR QL STRIP: NEGATIVE
BUN SERPL-MCNC: 20 MG/DL (ref 7–25)
CALCIUM SERPL-MCNC: 8.5 MG/DL (ref 8.6–10.5)
CHLORIDE SERPL-SCNC: 105 MMOL/L (ref 98–107)
CLARITY UR: CLEAR
CO2 SERPL-SCNC: 27 MMOL/L (ref 21–31)
COLOR UR: YELLOW
CREAT SERPL-MCNC: 1.22 MG/DL (ref 0.7–1.3)
ERYTHROCYTE [DISTWIDTH] IN BLOOD BY AUTOMATED COUNT: 20.4 % (ref 11.5–14.5)
FERRITIN SERPL-MCNC: 7 NG/ML (ref 8–388)
GFR SERPL CREATININE-BSD FRML MDRD: 61 ML/MIN/1.73SQ M
GLUCOSE SERPL-MCNC: 116 MG/DL (ref 65–140)
GLUCOSE SERPL-MCNC: 140 MG/DL (ref 65–99)
GLUCOSE SERPL-MCNC: 142 MG/DL (ref 65–140)
GLUCOSE SERPL-MCNC: 164 MG/DL (ref 65–140)
GLUCOSE SERPL-MCNC: 167 MG/DL (ref 65–140)
GLUCOSE SERPL-MCNC: 173 MG/DL (ref 65–140)
GLUCOSE SERPL-MCNC: 175 MG/DL (ref 65–140)
GLUCOSE UR STRIP-MCNC: NEGATIVE MG/DL
HCT VFR BLD AUTO: 32.6 % (ref 42–47)
HGB BLD-MCNC: 9.9 G/DL (ref 14–18)
HGB UR QL STRIP.AUTO: NEGATIVE
IRON SATN MFR SERPL: 6 %
IRON SERPL-MCNC: 28 UG/DL (ref 65–175)
KETONES UR STRIP-MCNC: NEGATIVE MG/DL
LEUKOCYTE ESTERASE UR QL STRIP: NEGATIVE
LIPASE SERPL-CCNC: 124 U/L (ref 11–82)
MCH RBC QN AUTO: 23.4 PG (ref 26–34)
MCHC RBC AUTO-ENTMCNC: 30.4 G/DL (ref 31–37)
MCV RBC AUTO: 77 FL (ref 81–99)
NITRITE UR QL STRIP: NEGATIVE
P AXIS: 59 DEGREES
PH UR STRIP.AUTO: 5.5 [PH]
PLATELET # BLD AUTO: 254 THOUSANDS/UL (ref 149–390)
PLATELET BLD QL SMEAR: ADEQUATE
PMV BLD AUTO: 7.1 FL (ref 8.6–11.7)
POTASSIUM SERPL-SCNC: 5 MMOL/L (ref 3.5–5.5)
PR INTERVAL: 154 MS
PROT UR STRIP-MCNC: NEGATIVE MG/DL
QRS AXIS: -53 DEGREES
QRSD INTERVAL: 96 MS
QT INTERVAL: 390 MS
QTC INTERVAL: 427 MS
RBC # BLD AUTO: 4.24 MILLION/UL (ref 4.3–5.9)
RBC MORPH BLD: PRESENT
SODIUM SERPL-SCNC: 137 MMOL/L (ref 134–143)
SP GR UR STRIP.AUTO: 1.01 (ref 1–1.03)
T WAVE AXIS: 28 DEGREES
TIBC SERPL-MCNC: 491 UG/DL (ref 250–450)
TROPONIN I SERPL-MCNC: <0.03 NG/ML
TROPONIN I SERPL-MCNC: <0.03 NG/ML
UROBILINOGEN UR QL STRIP.AUTO: 0.2 E.U./DL
VENTRICULAR RATE: 72 BPM
WBC # BLD AUTO: 7.4 THOUSAND/UL (ref 4.8–10.8)

## 2020-03-24 PROCEDURE — 82948 REAGENT STRIP/BLOOD GLUCOSE: CPT

## 2020-03-24 PROCEDURE — 93010 ELECTROCARDIOGRAM REPORT: CPT | Performed by: INTERNAL MEDICINE

## 2020-03-24 PROCEDURE — 84484 ASSAY OF TROPONIN QUANT: CPT | Performed by: PHYSICIAN ASSISTANT

## 2020-03-24 PROCEDURE — A9585 GADOBUTROL INJECTION: HCPCS | Performed by: NURSE PRACTITIONER

## 2020-03-24 PROCEDURE — 82728 ASSAY OF FERRITIN: CPT | Performed by: NURSE PRACTITIONER

## 2020-03-24 PROCEDURE — 74183 MRI ABD W/O CNTR FLWD CNTR: CPT

## 2020-03-24 PROCEDURE — 99220 PR INITIAL OBSERVATION CARE/DAY 70 MINUTES: CPT | Performed by: PHYSICIAN ASSISTANT

## 2020-03-24 PROCEDURE — 85027 COMPLETE CBC AUTOMATED: CPT | Performed by: PHYSICIAN ASSISTANT

## 2020-03-24 PROCEDURE — 0DB68ZX EXCISION OF STOMACH, VIA NATURAL OR ARTIFICIAL OPENING ENDOSCOPIC, DIAGNOSTIC: ICD-10-PCS | Performed by: INTERNAL MEDICINE

## 2020-03-24 PROCEDURE — 99223 1ST HOSP IP/OBS HIGH 75: CPT | Performed by: INTERNAL MEDICINE

## 2020-03-24 PROCEDURE — 99239 HOSP IP/OBS DSCHRG MGMT >30: CPT | Performed by: NURSE PRACTITIONER

## 2020-03-24 PROCEDURE — 83550 IRON BINDING TEST: CPT | Performed by: NURSE PRACTITIONER

## 2020-03-24 PROCEDURE — 84484 ASSAY OF TROPONIN QUANT: CPT | Performed by: EMERGENCY MEDICINE

## 2020-03-24 PROCEDURE — 80048 BASIC METABOLIC PNL TOTAL CA: CPT | Performed by: PHYSICIAN ASSISTANT

## 2020-03-24 PROCEDURE — 96375 TX/PRO/DX INJ NEW DRUG ADDON: CPT

## 2020-03-24 PROCEDURE — 93005 ELECTROCARDIOGRAM TRACING: CPT

## 2020-03-24 PROCEDURE — 0DJ08ZZ INSPECTION OF UPPER INTESTINAL TRACT, VIA NATURAL OR ARTIFICIAL OPENING ENDOSCOPIC: ICD-10-PCS | Performed by: INTERNAL MEDICINE

## 2020-03-24 PROCEDURE — 83540 ASSAY OF IRON: CPT | Performed by: NURSE PRACTITIONER

## 2020-03-24 PROCEDURE — 81003 URINALYSIS AUTO W/O SCOPE: CPT | Performed by: EMERGENCY MEDICINE

## 2020-03-24 PROCEDURE — 36415 COLL VENOUS BLD VENIPUNCTURE: CPT | Performed by: EMERGENCY MEDICINE

## 2020-03-24 PROCEDURE — 83690 ASSAY OF LIPASE: CPT | Performed by: NURSE PRACTITIONER

## 2020-03-24 RX ORDER — LIDOCAINE HYDROCHLORIDE 20 MG/ML
15 SOLUTION OROPHARYNGEAL ONCE
Status: COMPLETED | OUTPATIENT
Start: 2020-03-24 | End: 2020-03-24

## 2020-03-24 RX ORDER — HYDROMORPHONE HCL/PF 1 MG/ML
1 SYRINGE (ML) INJECTION ONCE
Status: COMPLETED | OUTPATIENT
Start: 2020-03-24 | End: 2020-03-24

## 2020-03-24 RX ORDER — ACETAMINOPHEN 325 MG/1
650 TABLET ORAL EVERY 4 HOURS PRN
Status: CANCELLED | OUTPATIENT
Start: 2020-03-24

## 2020-03-24 RX ORDER — BUDESONIDE AND FORMOTEROL FUMARATE DIHYDRATE 80; 4.5 UG/1; UG/1
2 AEROSOL RESPIRATORY (INHALATION) 2 TIMES DAILY
COMMUNITY
Start: 2020-03-05 | End: 2021-03-05

## 2020-03-24 RX ORDER — PANTOPRAZOLE SODIUM 40 MG/1
40 TABLET, DELAYED RELEASE ORAL
Status: DISCONTINUED | OUTPATIENT
Start: 2020-03-24 | End: 2020-03-24 | Stop reason: HOSPADM

## 2020-03-24 RX ORDER — HEPARIN SODIUM 5000 [USP'U]/ML
5000 INJECTION, SOLUTION INTRAVENOUS; SUBCUTANEOUS EVERY 8 HOURS SCHEDULED
Status: CANCELLED | OUTPATIENT
Start: 2020-03-24

## 2020-03-24 RX ORDER — MAGNESIUM HYDROXIDE/ALUMINUM HYDROXICE/SIMETHICONE 120; 1200; 1200 MG/30ML; MG/30ML; MG/30ML
30 SUSPENSION ORAL EVERY 4 HOURS PRN
Status: CANCELLED | OUTPATIENT
Start: 2020-03-24

## 2020-03-24 RX ORDER — SUCRALFATE ORAL 1 G/10ML
1000 SUSPENSION ORAL
Status: CANCELLED | OUTPATIENT
Start: 2020-03-24

## 2020-03-24 RX ORDER — ALBUTEROL SULFATE 90 UG/1
2 AEROSOL, METERED RESPIRATORY (INHALATION) EVERY 6 HOURS PRN
COMMUNITY
Start: 2020-03-05

## 2020-03-24 RX ORDER — ONDANSETRON 2 MG/ML
4 INJECTION INTRAMUSCULAR; INTRAVENOUS EVERY 6 HOURS PRN
Status: DISCONTINUED | OUTPATIENT
Start: 2020-03-24 | End: 2020-03-24 | Stop reason: HOSPADM

## 2020-03-24 RX ORDER — DOCUSATE SODIUM 100 MG/1
100 CAPSULE, LIQUID FILLED ORAL 2 TIMES DAILY
Status: DISCONTINUED | OUTPATIENT
Start: 2020-03-25 | End: 2020-03-27 | Stop reason: HOSPADM

## 2020-03-24 RX ORDER — HEPARIN SODIUM 5000 [USP'U]/ML
5000 INJECTION, SOLUTION INTRAVENOUS; SUBCUTANEOUS EVERY 8 HOURS SCHEDULED
Status: DISCONTINUED | OUTPATIENT
Start: 2020-03-24 | End: 2020-03-24 | Stop reason: HOSPADM

## 2020-03-24 RX ORDER — PRAVASTATIN SODIUM 40 MG
80 TABLET ORAL
Status: DISCONTINUED | OUTPATIENT
Start: 2020-03-24 | End: 2020-03-24 | Stop reason: HOSPADM

## 2020-03-24 RX ORDER — ACETAMINOPHEN 325 MG/1
650 TABLET ORAL EVERY 4 HOURS PRN
Status: DISCONTINUED | OUTPATIENT
Start: 2020-03-24 | End: 2020-03-24 | Stop reason: HOSPADM

## 2020-03-24 RX ORDER — SUCRALFATE ORAL 1 G/10ML
1000 SUSPENSION ORAL
Status: DISCONTINUED | OUTPATIENT
Start: 2020-03-24 | End: 2020-03-25

## 2020-03-24 RX ORDER — LISINOPRIL 20 MG/1
40 TABLET ORAL DAILY
Status: CANCELLED | OUTPATIENT
Start: 2020-03-25

## 2020-03-24 RX ORDER — METOPROLOL SUCCINATE 25 MG/1
25 TABLET, EXTENDED RELEASE ORAL DAILY
Status: DISCONTINUED | OUTPATIENT
Start: 2020-03-25 | End: 2020-03-27 | Stop reason: HOSPADM

## 2020-03-24 RX ORDER — DOCUSATE SODIUM 100 MG/1
100 CAPSULE, LIQUID FILLED ORAL 2 TIMES DAILY
Status: DISCONTINUED | OUTPATIENT
Start: 2020-03-24 | End: 2020-03-24 | Stop reason: HOSPADM

## 2020-03-24 RX ORDER — LIDOCAINE 50 MG/G
1 PATCH TOPICAL DAILY
Status: DISCONTINUED | OUTPATIENT
Start: 2020-03-25 | End: 2020-03-27 | Stop reason: HOSPADM

## 2020-03-24 RX ORDER — PANTOPRAZOLE SODIUM 40 MG/1
40 TABLET, DELAYED RELEASE ORAL
Status: CANCELLED | OUTPATIENT
Start: 2020-03-25

## 2020-03-24 RX ORDER — SUCRALFATE ORAL 1 G/10ML
1000 SUSPENSION ORAL
Status: DISCONTINUED | OUTPATIENT
Start: 2020-03-24 | End: 2020-03-24 | Stop reason: HOSPADM

## 2020-03-24 RX ORDER — ASPIRIN 81 MG/1
324 TABLET, CHEWABLE ORAL ONCE
Status: COMPLETED | OUTPATIENT
Start: 2020-03-24 | End: 2020-03-24

## 2020-03-24 RX ORDER — MAGNESIUM HYDROXIDE/ALUMINUM HYDROXICE/SIMETHICONE 120; 1200; 1200 MG/30ML; MG/30ML; MG/30ML
30 SUSPENSION ORAL EVERY 4 HOURS PRN
Status: DISCONTINUED | OUTPATIENT
Start: 2020-03-24 | End: 2020-03-24 | Stop reason: HOSPADM

## 2020-03-24 RX ORDER — NITROGLYCERIN 0.4 MG/1
0.4 TABLET SUBLINGUAL
Status: DISCONTINUED | OUTPATIENT
Start: 2020-03-24 | End: 2020-03-24 | Stop reason: HOSPADM

## 2020-03-24 RX ORDER — ONDANSETRON 2 MG/ML
4 INJECTION INTRAMUSCULAR; INTRAVENOUS EVERY 6 HOURS PRN
Status: DISCONTINUED | OUTPATIENT
Start: 2020-03-24 | End: 2020-03-27 | Stop reason: HOSPADM

## 2020-03-24 RX ORDER — ASPIRIN 81 MG/1
81 TABLET, CHEWABLE ORAL DAILY
Status: DISCONTINUED | OUTPATIENT
Start: 2020-03-24 | End: 2020-03-24 | Stop reason: HOSPADM

## 2020-03-24 RX ORDER — PRAVASTATIN SODIUM 40 MG
80 TABLET ORAL
Status: CANCELLED | OUTPATIENT
Start: 2020-03-25

## 2020-03-24 RX ORDER — LISINOPRIL 20 MG/1
40 TABLET ORAL DAILY
Status: DISCONTINUED | OUTPATIENT
Start: 2020-03-24 | End: 2020-03-24 | Stop reason: HOSPADM

## 2020-03-24 RX ORDER — HEPARIN SODIUM 5000 [USP'U]/ML
5000 INJECTION, SOLUTION INTRAVENOUS; SUBCUTANEOUS EVERY 8 HOURS SCHEDULED
Status: DISCONTINUED | OUTPATIENT
Start: 2020-03-24 | End: 2020-03-27 | Stop reason: HOSPADM

## 2020-03-24 RX ORDER — ALBUTEROL SULFATE 90 UG/1
2 AEROSOL, METERED RESPIRATORY (INHALATION) EVERY 6 HOURS PRN
Status: DISCONTINUED | OUTPATIENT
Start: 2020-03-24 | End: 2020-03-27 | Stop reason: HOSPADM

## 2020-03-24 RX ORDER — NITROGLYCERIN 0.4 MG/1
0.4 TABLET SUBLINGUAL
Status: CANCELLED | OUTPATIENT
Start: 2020-03-24

## 2020-03-24 RX ORDER — ONDANSETRON 2 MG/ML
4 INJECTION INTRAMUSCULAR; INTRAVENOUS EVERY 6 HOURS PRN
Status: CANCELLED | OUTPATIENT
Start: 2020-03-24

## 2020-03-24 RX ORDER — ASPIRIN 81 MG/1
81 TABLET, CHEWABLE ORAL DAILY
Status: DISCONTINUED | OUTPATIENT
Start: 2020-03-25 | End: 2020-03-27 | Stop reason: HOSPADM

## 2020-03-24 RX ORDER — MAGNESIUM HYDROXIDE/ALUMINUM HYDROXICE/SIMETHICONE 120; 1200; 1200 MG/30ML; MG/30ML; MG/30ML
30 SUSPENSION ORAL ONCE
Status: COMPLETED | OUTPATIENT
Start: 2020-03-24 | End: 2020-03-24

## 2020-03-24 RX ORDER — LISINOPRIL 20 MG/1
40 TABLET ORAL DAILY
Status: DISCONTINUED | OUTPATIENT
Start: 2020-03-25 | End: 2020-03-27 | Stop reason: HOSPADM

## 2020-03-24 RX ORDER — MAGNESIUM HYDROXIDE/ALUMINUM HYDROXICE/SIMETHICONE 120; 1200; 1200 MG/30ML; MG/30ML; MG/30ML
30 SUSPENSION ORAL EVERY 4 HOURS PRN
Status: DISCONTINUED | OUTPATIENT
Start: 2020-03-24 | End: 2020-03-27 | Stop reason: HOSPADM

## 2020-03-24 RX ORDER — BUDESONIDE AND FORMOTEROL FUMARATE DIHYDRATE 80; 4.5 UG/1; UG/1
2 AEROSOL RESPIRATORY (INHALATION) 2 TIMES DAILY
Status: DISCONTINUED | OUTPATIENT
Start: 2020-03-25 | End: 2020-03-27 | Stop reason: HOSPADM

## 2020-03-24 RX ORDER — METOPROLOL SUCCINATE 25 MG/1
25 TABLET, EXTENDED RELEASE ORAL DAILY
Status: DISCONTINUED | OUTPATIENT
Start: 2020-03-24 | End: 2020-03-24 | Stop reason: HOSPADM

## 2020-03-24 RX ORDER — DOCUSATE SODIUM 100 MG/1
100 CAPSULE, LIQUID FILLED ORAL 2 TIMES DAILY
Status: CANCELLED | OUTPATIENT
Start: 2020-03-25

## 2020-03-24 RX ORDER — PANTOPRAZOLE SODIUM 40 MG/1
40 TABLET, DELAYED RELEASE ORAL
Status: DISCONTINUED | OUTPATIENT
Start: 2020-03-25 | End: 2020-03-27 | Stop reason: HOSPADM

## 2020-03-24 RX ORDER — METOPROLOL SUCCINATE 25 MG/1
25 TABLET, EXTENDED RELEASE ORAL DAILY
Status: CANCELLED | OUTPATIENT
Start: 2020-03-25

## 2020-03-24 RX ORDER — NITROGLYCERIN 0.4 MG/1
0.4 TABLET SUBLINGUAL
Status: DISCONTINUED | OUTPATIENT
Start: 2020-03-24 | End: 2020-03-27 | Stop reason: HOSPADM

## 2020-03-24 RX ORDER — ASPIRIN 81 MG/1
81 TABLET, CHEWABLE ORAL DAILY
Status: CANCELLED | OUTPATIENT
Start: 2020-03-25

## 2020-03-24 RX ORDER — PRAVASTATIN SODIUM 80 MG/1
80 TABLET ORAL
Status: DISCONTINUED | OUTPATIENT
Start: 2020-03-25 | End: 2020-03-27 | Stop reason: HOSPADM

## 2020-03-24 RX ORDER — ACETAMINOPHEN 325 MG/1
650 TABLET ORAL EVERY 4 HOURS PRN
Status: DISCONTINUED | OUTPATIENT
Start: 2020-03-24 | End: 2020-03-25

## 2020-03-24 RX ADMIN — GADOBUTROL 8 ML: 604.72 INJECTION INTRAVENOUS at 18:12

## 2020-03-24 RX ADMIN — ALUMINUM HYDROXIDE, MAGNESIUM HYDROXIDE, AND SIMETHICONE 30 ML: 200; 200; 20 SUSPENSION ORAL at 02:11

## 2020-03-24 RX ADMIN — HEPARIN SODIUM 5000 UNITS: 5000 INJECTION INTRAVENOUS; SUBCUTANEOUS at 14:45

## 2020-03-24 RX ADMIN — HEPARIN SODIUM 5000 UNITS: 5000 INJECTION INTRAVENOUS; SUBCUTANEOUS at 05:46

## 2020-03-24 RX ADMIN — PANTOPRAZOLE SODIUM 40 MG: 40 TABLET, DELAYED RELEASE ORAL at 05:46

## 2020-03-24 RX ADMIN — MORPHINE SULFATE 2 MG: 2 INJECTION, SOLUTION INTRAMUSCULAR; INTRAVENOUS at 10:46

## 2020-03-24 RX ADMIN — PRAVASTATIN SODIUM 80 MG: 40 TABLET ORAL at 15:55

## 2020-03-24 RX ADMIN — ACETAMINOPHEN 650 MG: 325 TABLET ORAL at 08:33

## 2020-03-24 RX ADMIN — LIDOCAINE HYDROCHLORIDE 15 ML: 20 SOLUTION ORAL; TOPICAL at 02:11

## 2020-03-24 RX ADMIN — METOPROLOL SUCCINATE 25 MG: 25 TABLET, EXTENDED RELEASE ORAL at 08:32

## 2020-03-24 RX ADMIN — DOCUSATE SODIUM 100 MG: 100 CAPSULE, LIQUID FILLED ORAL at 18:25

## 2020-03-24 RX ADMIN — SUCRALFATE 1000 MG: 1 SUSPENSION ORAL at 15:55

## 2020-03-24 RX ADMIN — ASPIRIN 81 MG 324 MG: 81 TABLET ORAL at 02:11

## 2020-03-24 RX ADMIN — MORPHINE SULFATE 2 MG: 2 INJECTION, SOLUTION INTRAMUSCULAR; INTRAVENOUS at 22:53

## 2020-03-24 RX ADMIN — SERTRALINE HYDROCHLORIDE 75 MG: 50 TABLET ORAL at 08:32

## 2020-03-24 RX ADMIN — HYDROMORPHONE HYDROCHLORIDE 1 MG: 1 INJECTION, SOLUTION INTRAMUSCULAR; INTRAVENOUS; SUBCUTANEOUS at 00:28

## 2020-03-24 RX ADMIN — ASPIRIN 81 MG 81 MG: 81 TABLET ORAL at 08:32

## 2020-03-24 RX ADMIN — INSULIN LISPRO 1 UNITS: 100 INJECTION, SOLUTION INTRAVENOUS; SUBCUTANEOUS at 16:39

## 2020-03-24 RX ADMIN — SUCRALFATE 1000 MG: 1 SUSPENSION ORAL at 10:46

## 2020-03-24 RX ADMIN — LISINOPRIL 40 MG: 20 TABLET ORAL at 08:31

## 2020-03-24 RX ADMIN — MORPHINE SULFATE 2 MG: 2 INJECTION, SOLUTION INTRAMUSCULAR; INTRAVENOUS at 15:55

## 2020-03-24 RX ADMIN — IOHEXOL 100 ML: 350 INJECTION, SOLUTION INTRAVENOUS at 00:22

## 2020-03-24 RX ADMIN — SUCRALFATE 1000 MG: 1 SUSPENSION ORAL at 22:53

## 2020-03-24 NOTE — ASSESSMENT & PLAN NOTE
Lab Results   Component Value Date    HGBA1C 6 8 (H) 10/25/2019       Recent Labs     03/24/20  0254   POCGLU 167*       Blood Sugar Average: Last 72 hrs:  (P) 167     Place on [facility] step 2 diet, hold pre-hospital oral antihyperglycemics, obtain Accu-Cheks AC and HS with Humalog correction dose a c   And HS

## 2020-03-24 NOTE — ASSESSMENT & PLAN NOTE
· Unclear etiology MRI shows suspected mass within the distal gastric body  · Worse with food   · Patient had an EGD and colonoscopy 3/3/20- results are unremarkable except chronic inflammation on EGD per patient     · GI input appreciated   · Concerning about splenomegaly with iron deficiency anemia   · Pain is improved with pain medication  · The patient will be transferred to [facility] for EGD and EUS per GI recommendation

## 2020-03-24 NOTE — ED PROVIDER NOTES
History  Chief Complaint   Patient presents with    Abdominal Pain     Pt states he's had intermittent sharp, shooting LUQ pain since earlier tonight with excessive burping  Pt denies nausea, vomiting, diarrhea, constipation, cough, fevwer, CP, or SOB     75YEAR-OLD MALE    PMH:     HE IS HERE FOR LEFT lower chest pain  Pain started several hours ago  PAIN IS RATED AS Severe  DESCRIBED AS SHARP    PATIENT HAS associated SHORTNESS OF BREATH  NO   COMPLAINTS OF DIAPHORESIS  HE DENIES ANY RADIATION OF PAIN TO THE JAW NECK OR THE BACK  INTERVENTIONS: NONE  PATIENT DENIES ANY COUGH, NO FEVERS OR CHILLS  No URI symptoms  No COVID19 RFs: no sick contacts or travel to high risk endemic areas    VTE  RISK FACTORS:  NONE  NO HISTORY OF PE OR DVT  NO LONG CAR RIDES OR PLANE RIDES  NO IMMOBILIZATION  NO RECENT SURGERY OR TRAUMA  NO HEMOPTYSIS  OTHER ASSOCIATED SYMPTOMS:  NO NAUSEA OR VOMITING  NO STOOL CHANGES  History provided by:  Patient  Chest Pain   Pain location:  L chest  Pain quality: sharp    Pain severity:  Severe  Onset quality:  Sudden  Timing:  Constant  Progression:  Unchanged  Chronicity:  New  Relieved by:  Nothing  Worsened by:  Nothing tried  Associated symptoms: no abdominal pain, no anxiety, no back pain, no claudication, no cough, no diaphoresis, no dizziness, no fatigue, no fever, no headache, no lower extremity edema, no nausea, no palpitations, no shortness of breath, no syncope, not vomiting and no weakness    Risk factors: male sex and obesity        Prior to Admission Medications   Prescriptions Last Dose Informant Patient Reported? Taking?   aspirin 81 mg chewable tablet 3/23/2020 at Unknown time  Yes Yes   Sig: Chew 81 mg   gabapentin (NEURONTIN) 300 mg capsule   Yes No   Sig: Take 300 mg by mouth   glipiZIDE (GLUCOTROL) 5 mg tablet 3/23/2020 at Unknown time  Yes Yes   Si tab with dinner     lisinopril (ZESTRIL) 40 mg tablet   Yes No   Sig: Take 40 mg by mouth metFORMIN (GLUCOPHAGE) 500 mg tablet 3/23/2020 at Unknown time  Yes Yes   Sig: Take 500 mg by mouth   metoprolol succinate (TOPROL-XL) 25 mg 24 hr tablet 3/23/2020 at Unknown time  Yes Yes   Sig: Take 25 mg by mouth   pantoprazole (PROTONIX) 40 mg tablet 3/23/2020 at Unknown time  Yes Yes   Sig: take 1 tablet by mouth once daily   sertraline (ZOLOFT) 50 mg tablet 3/23/2020 at Unknown time  Yes Yes   Si 5 tab daily  simvastatin (ZOCOR) 40 mg tablet 3/23/2020 at Unknown time  Yes Yes   Sig: Take 40 mg by mouth   tobramycin-dexamethasone (TOBRADEX) ophthalmic suspension   No No   Sig: Administer 1 drop into the left eye every 4 (four) hours while awake for 7 days      Facility-Administered Medications: None       Past Medical History:   Diagnosis Date    Asthma     Diabetes mellitus ([facility] [address] )     GERD (gastroesophageal reflux disease)     Hypertension        Past Surgical History:   Procedure Laterality Date    HERNIA REPAIR      umbilical       History reviewed  No pertinent family history  I have reviewed and agree with the history as documented  E-Cigarette/Vaping    E-Cigarette Use Never User      E-Cigarette/Vaping Substances     Social History     Tobacco Use    Smoking status: Heavy Tobacco Smoker     Packs/day: 0 50    Smokeless tobacco: Never Used   Substance Use Topics    Alcohol use: Yes     Frequency: 2-3 times a week     Comment: occassionally     Drug use: No       Review of Systems   Constitutional: Negative for chills, diaphoresis, fatigue and fever  Respiratory: Negative for cough, shortness of breath, wheezing and stridor  Cardiovascular: Positive for chest pain  Negative for palpitations, claudication, leg swelling and syncope  Gastrointestinal: Negative for abdominal pain, blood in stool, nausea and vomiting  Genitourinary: Negative for difficulty urinating, dysuria, flank pain and frequency     Musculoskeletal: Negative for arthralgias, back pain, gait problem, joint swelling, myalgias, neck pain and neck stiffness  Skin: Negative for rash and wound  Neurological: Negative for dizziness, weakness, light-headedness and headaches  All other systems reviewed and are negative  Physical Exam  Physical Exam   Constitutional: He is oriented to person, place, and time  He appears well-developed and well-nourished  Non-toxic appearance  He does not appear ill  No distress  HENT:   Head: Normocephalic and atraumatic  Nose: Nose normal    Mouth/Throat: Oropharynx is clear and moist  No oropharyngeal exudate  Eyes: Pupils are equal, round, and reactive to light  Conjunctivae and EOM are normal  Right eye exhibits no discharge  Left eye exhibits no discharge  No scleral icterus  Neck: Normal range of motion  Neck supple  No JVD present  No tracheal deviation present  Cardiovascular: Normal rate, regular rhythm, normal heart sounds and intact distal pulses  Exam reveals no gallop and no friction rub  No murmur heard  Pulmonary/Chest: Effort normal and breath sounds normal  No stridor  No respiratory distress  He has no wheezes  He has no rhonchi  He has no rales  He exhibits no tenderness  Abdominal: Soft  Bowel sounds are normal  He exhibits no distension, no pulsatile liver, no abdominal bruit, no ascites, no pulsatile midline mass and no mass  There is no hepatosplenomegaly, splenomegaly or hepatomegaly  There is no tenderness  There is no rigidity, no rebound, no guarding, no CVA tenderness, no tenderness at McBurney's point and negative Lamar's sign  No hernia  Musculoskeletal: Normal range of motion  He exhibits no edema, tenderness or deformity  Lymphadenopathy:     He has no cervical adenopathy  Neurological: He is alert and oriented to person, place, and time  No cranial nerve deficit or sensory deficit  He exhibits normal muscle tone  Coordination normal    Skin: Skin is warm  Capillary refill takes less than 2 seconds  No rash noted   He is not diaphoretic  No cyanosis or erythema  No pallor     Psychiatric: His behavior is normal  Judgment and thought content normal    Agitated affect from pain       Vital Signs  ED Triage Vitals [03/23/20 2244]   Temperature Pulse Respirations Blood Pressure SpO2   98 4 °F (36 9 °C) 80 20 142/83 97 %      Temp Source Heart Rate Source Patient Position - Orthostatic VS BP Location FiO2 (%)   Temporal Monitor -- Right arm --      Pain Score       --           Vitals:    03/23/20 2330 03/24/20 0100 03/24/20 0130 03/24/20 0200   BP: 129/70 141/80 118/64 121/79   Pulse: 79 82 83 84         Visual Acuity      ED Medications  Medications   morphine (PF) 4 mg/mL injection 4 mg (4 mg Intravenous Given 3/23/20 2315)   sodium chloride 0 9 % bolus 1,000 mL (0 mL Intravenous Stopped 3/24/20 0006)   HYDROmorphone (DILAUDID) injection 1 mg (1 mg Intravenous Given 3/24/20 0028)   iohexol (OMNIPAQUE) 350 MG/ML injection (SINGLE-DOSE) 100 mL (100 mL Intravenous Given 3/24/20 0022)   aspirin chewable tablet 324 mg (324 mg Oral Given 3/24/20 0211)   aluminum-magnesium hydroxide-simethicone (MYLANTA) 200-200-20 mg/5 mL oral suspension 30 mL (30 mL Oral Given 3/24/20 0211)   Lidocaine Viscous HCl (XYLOCAINE) 2 % mucosal solution 15 mL (15 mL Swish & Swallow Given 3/24/20 0211)       Diagnostic Studies  Results Reviewed     Procedure Component Value Units Date/Time    Troponin I [[id number]]  (Normal) Collected:  03/24/20 0217    Lab Status:  Final result Specimen:  Blood from Arm, Left Updated:  03/24/20 0239     Troponin I <0 03 ng/mL     Troponin I [[id number]]  (Normal) Collected:  03/23/20 2314    Lab Status:  Final result Specimen:  Blood from Arm, Left Updated:  03/23/20 2343     Troponin I <0 03 ng/mL     Magnesium [[id number]]  (Normal) Collected:  03/23/20 2314    Lab Status:  Final result Specimen:  Blood from Arm, Left Updated:  03/23/20 2340     Magnesium 2 7 mg/dL     CMP [[id number]]  (Abnormal) Collected:  03/23/20 2314    Lab Status:  Final result Specimen:  Blood from Arm, Left Updated:  03/23/20 2340     Sodium 138 mmol/L      Potassium 4 7 mmol/L      Chloride 104 mmol/L      CO2 26 mmol/L      ANION GAP 8 mmol/L      BUN 22 mg/dL      Creatinine 1 37 mg/dL      Glucose 150 mg/dL      Calcium 8 8 mg/dL      AST 31 U/L      ALT 20 U/L      Alkaline Phosphatase 49 U/L      Total Protein 6 7 g/dL      Albumin 4 2 g/dL      Total Bilirubin 0 20 mg/dL      eGFR 53 ml/min/1 73sq m     Narrative:       National Kid[employer] guidelines for Chronic Kidney Disease (CKD):     Stage 1 with normal or high GFR (GFR > 90 mL/min/1 73 square meters)    Stage 2 Mild CKD (GFR = 60-89 mL/min/1 73 square meters)    Stage 3A Moderate CKD (GFR = 45-59 mL/min/1 73 square meters)    Stage 3B Moderate CKD (GFR = 30-44 mL/min/1 73 square meters)    Stage 4 Severe CKD (GFR = 15-29 mL/min/1 73 square meters)    Stage 5 End Stage CKD (GFR <15 mL/min/1 73 square meters)  Note: GFR calculation is accurate only with a steady state creatinine    Lipase [[id number]]  (Normal) Collected:  03/23/20 2314    Lab Status:  Final result Specimen:  Blood from Arm, Left Updated:  03/23/20 2340     Lipase 75 u/L     CBC and differential [[id number]]  (Abnormal) Collected:  03/23/20 2314    Lab Status:  Final result Specimen:  Blood from Arm, Left Updated:  03/23/20 2326     WBC 8 30 Thousand/uL      RBC 4 51 Million/uL      Hemoglobin 10 7 g/dL      Hematocrit 34 2 %      MCV 76 fL      MCH 23 7 pg      MCHC 31 2 g/dL      RDW 20 6 %      MPV 7 1 fL      Platelets 101 Thousands/uL      Neutrophils Relative 62 %      Lymphocytes Relative 28 %      Monocytes Relative 9 %      Eosinophils Relative 1 %      Basophils Relative 1 %      Neutrophils Absolute 5 10 Thousands/µL      Lymphocytes Absolute 2 30 Thousands/µL      Monocytes Absolute 0 70 Thousand/µL      Eosinophils Absolute 0 10 Thousand/µL      Basophils Absolute 0 00 Thousands/µL     UA w Reflex to Microscopic w Reflex to Culture [[id number]]     Lab Status:  No result Specimen:  Urine                  PE Study with CT abdomen & pelvis with contrast   Final Result by Raj Chopra MD (03/24 0059)      Limited CTA of the chest secondary to the contrast bolus demonstrates no gross evidence of an intraluminal filling defect within the proximal pulmonary artery branches to suggest an acute pulmonary embolus  However, evaluation for distal subsegmental    emboli was limited  No infiltrate or pleural effusion  No acute intra-abdominal abnormality  No free air or free fluid  Scattered colonic diverticulosis with no inflammatory changes present to suggest acute diverticulitis  Workstation performed: [id number]                    Procedures  Procedures         ED Course  ED Course as of Mar 24 0244   Mon Mar 23, 2020   2350 WBC: 8 30   2350 Hemoglobin(!): 10 7   2350 HCT(!): 34 2   2350 Neutrophils %: 62   2350 Lymphocytes Relative: 28   2350 Monocytes Relative: 9   2350 Troponin I: <0 03   2350 Magnesium: 2 7   2350 Lipase: 75   2350 Sodium: 138   2350 Potassium: 4 7   2350 Chloride: 104   2350 CO2: 26   2350 Anion Gap: 8   2350 BUN: 22   2350 Creatinine(!): 1 37   2350 Glucose, Random(!): 150   2350 Calcium: 8 8   2350 AST: 31   2350 ALT: 20   2350 Alkaline Phosphatase(!): 49   2350 Total Protein: 6 7   2350 eGFR: 53   Tue Mar 24, 2020   0121 CT CHEST AND CT ABDOMEN     FINDINGS:  CHEST  PULMONARY ARTERIAL TREE:  The study was limited by the timing of the contrast bolus and patient's body habitus  Although there is no gross evidence of an intraluminal filling defect within a proximal pulmonary artery branch, evaluation for distal   subsegmental emboli is limited      LUNGS:  Lungs are clear    There is no tracheal or endobronchial lesion      PLEURA:  Unremarkable      HEART/AORTA:  Unremarkable for patient's age      MEDIASTINUM AND KHARI:  Unremarkable      CHEST WALL AND LOWER NECK: Unremarkable      ABDOMEN     LIVER/BILIARY TREE:  Unremarkable      GALLBLADDER:  No calcified gallstones  No pericholecystic inflammatory change      SPLEEN:  Unremarkable      PANCREAS:  Unremarkable      ADRENAL GLANDS:  Unremarkable      KIDNEYS/URETERS:  Unremarkable  No hydronephrosis      STOMACH AND BOWEL:  There is colonic diverticulosis without evidence of acute diverticulitis      APPENDIX:  A normal appendix was visualized      ABDOMINOPELVIC CAVITY:  No ascites or free intraperitoneal air  No lymphadenopathy      VESSELS:  Unremarkable for patient's age      PELVIS     REPRODUCTIVE ORGANS:  The prostate is enlarged      URINARY BLADDER:  Unremarkable      ABDOMINAL WALL/INGUINAL REGIONS:  Unremarkable      OSSEOUS STRUCTURES:  No acute fracture or destructive osseous lesion      IMPRESSION:     Limited CTA of the chest secondary to the contrast bolus demonstrates no gross evidence of an intraluminal filling defect within the proximal pulmonary artery branches to suggest an acute pulmonary embolus  However, evaluation for distal subsegmental   emboli was limited      No infiltrate or pleural effusion      No acute intra-abdominal abnormality  No free air or free fluid      Scattered colonic diverticulosis with no inflammatory changes present to suggest acute diverticulitis  [address]ector  Will obs                                    MDM      Disposition  Final diagnoses:   Chest pain     Time reflects when diagnosis was documented in both MDM as applicable and the Disposition within this note     Time User Action Codes Description Comment    3/24/2020  1:30 AM Bhavna Chappell Add [R07 9] Chest pain       ED Disposition     ED Disposition Condition Date/Time Comment    Admit Stable Tue Mar 24, 2020  1:30 AM Case was discussed with Cathi Hector and the patient's admission status was agreed to be Admission Status: observation status to the service of Dr Janessa Youngblood           Follow-up Information    None Current Discharge Medication List      CONTINUE these medications which have NOT CHANGED    Details   aspirin 81 mg chewable tablet Chew 81 mg      glipiZIDE (GLUCOTROL) 5 mg tablet 1 tab with dinner  metFORMIN (GLUCOPHAGE) 500 mg tablet Take 500 mg by mouth      metoprolol succinate (TOPROL-XL) 25 mg 24 hr tablet Take 25 mg by mouth      pantoprazole (PROTONIX) 40 mg tablet take 1 tablet by mouth once daily      sertraline (ZOLOFT) 50 mg tablet 1 5 tab daily  simvastatin (ZOCOR) 40 mg tablet Take 40 mg by mouth      gabapentin (NEURONTIN) 300 mg capsule Take 300 mg by mouth      lisinopril (ZESTRIL) 40 mg tablet Take 40 mg by mouth      tobramycin-dexamethasone (TOBRADEX) ophthalmic suspension Administer 1 drop into the left eye every 4 (four) hours while awake for 7 days  Qty: 1 75 mL, Refills: 0    Associated Diagnoses: Conjunctivitis           No discharge procedures on file      PDMP Review     None          ED Provider  Electronically Signed by           Anat Zambrano MD  03/24/20 4436

## 2020-03-24 NOTE — ASSESSMENT & PLAN NOTE
· This is resolved; suspected this be stemmed from his LUQ pain   · ACS is ruled out, no ischemic changes on EKG  · Troponin x 3- negative   · Continue aspirin, lisinopril, statin and metoprolol

## 2020-03-24 NOTE — ASSESSMENT & PLAN NOTE
· Placed in observation telemetry  · Trend cardiac enzymes  · Repeat EKG in a m    · Continue pre-hospital aspirin, lisinopril, statin and metoprolol  · Patient is history is not consistent with cardiac origin though does have significant cardiac risk factors

## 2020-03-24 NOTE — UTILIZATION REVIEW
Initial Clinical Review    Admission: Date/Time:  3/24/20 at 0130 Observation - converted to Inpatient 3/24/20 at [id number]     03/24/20 1544  Inpatient Admission Once     Transfer Service: General Medicine       Question Answer Comment   Admitting Physician Rima Roca    Level of Care Med Surg    Estimated length of stay More than 2 Midnights    Certification I certify that inpatient services are medically necessary for this patient for a duration of greater than two midnights  See H&P and MD Progress Notes for additional information about the patient's course of treatment  03/24/20 1544       ED Arrival Information     Expected Arrival Acuity Means of Arrival Escorted By Service Admission Type    - 3/23/2020 22:33 Urgent Ambulance [facility] Hospitalist Urgent    Arrival Complaint    chest pain     Chief Complaint   Patient presents with    Abdominal Pain     Pt states he's had intermittent sharp, shooting LUQ pain since earlier tonight with excessive burping  Pt denies nausea, vomiting, diarrhea, constipation, cough, fevwer, CP, or SOB     Chief Complaint:   Chest pain x1 day     History of Present Illness:   Kim Royal is a 75 y o  male who presents with chest pain x1 day  Patient is a somewhat difficult historian initially presented the ER complaining of abdominal pain which then turned into left anterior lateral chest wall pain  Patient states that this began suddenly last evening awakening him from sleep the when questioned further he states that this is been ongoing for several months and recently had a colonoscopy and an endoscopy and according to wife was told he had a hernia      Patient is complaining of abdominal distention and tenderness of such located left upper quadrant and feeling gassy  Patient denies any palpitations, syncope, lightheadedness, nausea, vomiting or diarrhea    Patient reports his pain is 8/10 and sharp and stabbing in nature without radiation and no aggravating or alleviating factors      Patient denies any cardiac history he does not currently see a cardiologist and has never had a stress test or an echocardiogram done  Patient does have significant cardiac risk factors include non-insulin-dependent diabetes, current smoker, hypercholesterolemia and hypertension      Review of Systems:  Respiratory: Negative for cough, shortness of breath and wheezing  Gastrointestinal: Positive for abdominal distention and abdominal pain        Assessment/Plan:   Other chest pain  Assessment & Plan  · Placed in observation telemetry  · Trend cardiac enzymes  · Repeat EKG in a m  · Continue pre-hospital aspirin, lisinopril, statin and metoprolol  · Patient is history is not consistent with cardiac origin though does have significant cardiac risk factors     Diabetes mellitus type 2, controlled ([facility] [address] )  Assessment & Plan  Lab Results   Component Value Date     HGBA1C 6 8 (H) 10/25/2019       Recent Labs     03/24/20  0254   POCGLU 167*       Blood Sugar Average: Last 72 hrs:  (P) 167    Place on [facility] step 2 diet, hold pre-hospital oral antihyperglycemics, obtain Accu-Cheks AC and HS with Humalog correction dose a c  And HS      VTE Prophylaxis: Heparin    Anticipated Length of Stay:  Patient will be admitted on an Observation basis with an anticipated length of stay of  < 2 midnights   Justification for Hospital Stay:  Chest pain rule out MI requiring further cardiac evaluation    ED Triage Vitals   Temperature Pulse Respirations Blood Pressure SpO2   03/23/20 2244 03/23/20 2244 03/23/20 2244 03/23/20 2244 03/23/20 2244   98 4 °F (36 9 °C) 80 20 142/83 97 %      Temp Source Heart Rate Source Patient Position - Orthostatic VS BP Location FiO2 (%)   03/23/20 2244 03/23/20 2244 03/24/20 0247 03/23/20 2244 --   Temporal Monitor Lying Right arm       Pain Score       03/24/20 0252       Worst Possible Pain        Wt Readings from Last 1 Encounters:   03/24/20 92 1 kg (203 lb 0 7 oz)     Additional Vital Signs:  03/24/20 0754  97 6 °F (36 4 °C)  76  24Abnormal   128/96    94 %  None (Room air)    03/24/20 0247  97 3 °F (36 3 °C)Abnormal   81  23Abnormal   131/81    93 %  None (Room air)    03/24/20 0200    84  23Abnormal   121/79  95  93 %      03/24/20 0130    83  21  118/64  85  92 %      03/24/20 0100    82  22  141/80  105  90 %      03/23/20 2330    79  22  129/70  92  92 %        Pertinent Labs/Diagnostic Test Results:   Results from last 7 days   Lab Units 03/24/20  0456 03/23/20  2314   WBC Thousand/uL 7 40 8 30   HEMOGLOBIN g/dL 9 9* 10 7*   HEMATOCRIT % 32 6* 34 2*   PLATELETS Thousands/uL 254 288   NEUTROS ABS Thousands/µL  --  5 10     Results from last 7 days   Lab Units 03/24/20  0456 03/23/20  2314   SODIUM mmol/L 137 138   POTASSIUM mmol/L 5 0 4 7   CHLORIDE mmol/L 105 104   CO2 mmol/L 27 26   ANION GAP mmol/L 5 8   BUN mg/dL 20 22   CREATININE mg/dL 1 22 1 37*   EGFR ml/min/1 73sq m 61 53   CALCIUM mg/dL 8 5* 8 8   MAGNESIUM mg/dL  --  2 7     Results from last 7 days   Lab Units 03/23/20  2314   AST U/L 31   ALT U/L 20   ALK PHOS U/L 49*   TOTAL PROTEIN g/dL 6 7   ALBUMIN g/dL 4 2   TOTAL BILIRUBIN mg/dL 0 20     Results from last 7 days   Lab Units 03/24/20  0833 03/24/20  0254   POC GLUCOSE mg/dl 116 167*     Results from last 7 days   Lab Units 03/24/20  0456 03/24/20  0217 03/23/20  2314   TROPONIN I ng/mL <0 03 <0 03 <0 03       Results from last 7 days   Lab Units 03/23/20  2314   LIPASE u/L 75        EKG -  Normal sinus rhythm  Left axis deviation  Low voltage QRS  Abnormal ECG  When compared with ECG of 24-MAR-2020 05:11, (unconfirmed)  No significant change was found    CT PULMONARY ANGIOGRAM OF THE CHEST AND CT ABDOMEN AND PELVIS WITH INTRAVENOUS CONTRAST  Limited CTA of the chest secondary to the contrast bolus demonstrates no gross evidence of an intraluminal filling defect within the proximal pulmonary artery branches to suggest an acute pulmonary embolus   However, evaluation for distal subsegmental   emboli was limited  No infiltrate or pleural effusion  No acute intra-abdominal abnormality   No free air or free fluid  Scattered colonic diverticulosis with no inflammatory changes present to suggest acute diverticulitis      ED Treatment:   Medication Administration from 03/23/2020 2233 to 03/24/2020 0923       Date/Time Order Dose Route Action     03/23/2020 2315 morphine (PF) 4 mg/mL injection 4 mg 4 mg Intravenous Given     03/24/2020 0006 sodium chloride 0 9 % bolus 1,000 mL 0 mL Intravenous Stopped     03/23/2020 2315 sodium chloride 0 9 % bolus 1,000 mL 1,000 mL Intravenous New Bag     03/24/2020 0028 HYDROmorphone (DILAUDID) injection 1 mg 1 mg Intravenous Given     03/24/2020 0022 iohexol (OMNIPAQUE) 350 MG/ML injection (SINGLE-DOSE) 100 mL 100 mL Intravenous Given     03/24/2020 0211 aspirin chewable tablet 324 mg 324 mg Oral Given     03/24/2020 0211 aluminum-magnesium hydroxide-simethicone (MYLANTA) 200-200-20 mg/5 mL oral suspension 30 mL 30 mL Oral Given     03/24/2020 0211 Lidocaine Viscous HCl (XYLOCAINE) 2 % mucosal solution 15 mL 15 mL Swish & Swallow Given     Past Medical History:   Diagnosis Date    Asthma     [facility]     GERD (gastroesophageal reflux disease)     Hypertension      Present on Admission:   Other chest pain   [facility], [facility]   Essential hypertension   GERD (gastroesophageal reflux disease)   Hyperlipidemia   Tobacco abuse    Admitting Diagnosis: Chest pain [R07 9]  Abdominal pain [R10 9]    Age/Sex: 75 y o  male    Admission Orders:  Telemetry  VS q4hrs  Up + OOB as Tolerated  Diet Baljit/CHO Controlled; Consistent Carbohydrate Diet Level 2 (5 carb servings/75 grams CHO/meal)    Scheduled Medications:  aspirin 81 mg Oral Daily   heparin (porcine) 5,000 Units Subcutaneous Q8H [address]   insulin lispro 1-6 Units Subcutaneous TID AC   insulin lispro 1-6 Units Subcutaneous HS lisinopril 40 mg Oral Daily   metoprolol succinate 25 mg Oral Daily   nicotine 1 patch Transdermal Daily   pantoprazole 40 mg Oral Early Morning   pravastatin 80 mg Oral Daily With Dinner   sertraline 75 mg Oral Daily      PRN Meds:  acetaminophen 650 mg Oral Q4H PRN  GIVEN X 1   nitroglycerin 0 4 mg Sublingual Q5 Min PRN   ondansetron 4 mg Intravenous Q6H PRN     Network Utilization Review Department  [email or web address]  ATTENTION: Please call with any questions or concerns to [phone] and carefully listen to the prompts so that you are directed to the right person  All voicemails are confidential   Zana Los all requests for admission clinical reviews, approved or denied determinations and any other requests to dedicated fax number below belonging to the campus where the patient is receiving treatment   List of dedicated fax numbers for the Facilities:  [address] DENIALS (Administrative/Medical Necessity) [phone]   [address] (Maternity/NICU/Pediatrics) [phone]   [facility] [phone]   [facility] [phone]   [facility] [phone]   Christean Half [phone]   [address] [facility] [address] [facility] [phone]   [facility]  [phone]   [address] [facility]  [address] [facility] [address] [facility] [phone]

## 2020-03-24 NOTE — ASSESSMENT & PLAN NOTE
· Microcytic hypochromic anemia  · I spoke with the wife and she states that the patient was on iron supplement prior however stopped a few weeks ago due to constipation  · Hemoglobin stable at this time  · Suspected that this may be related to malignancy process

## 2020-03-24 NOTE — H&P
H&P- Junior Tanner 1955, 75 y o  male MRN: [id number]    Unit/Bed#: -01 Encounter: [id number]    Primary Care Provider: Randal Celaya MD   Date and time admitted to hospital: 3/23/2020 10:42 PM        * Other chest pain  Assessment & Plan  · Placed in observation telemetry  · Trend cardiac enzymes  · Repeat EKG in a m  · Continue pre-hospital aspirin, lisinopril, statin and metoprolol  · Patient is history is not consistent with cardiac origin though does have significant cardiac risk factors    Diabetes mellitus type 2, controlled ([facility] [address] )  Assessment & Plan  Lab Results   Component Value Date    HGBA1C 6 8 (H) 10/25/2019       Recent Labs     03/24/20  0254   POCGLU 167*       Blood Sugar Average: Last 72 hrs:  (P) 167     Place on [facility] step 2 diet, hold pre-hospital oral antihyperglycemics, obtain Accu-Cheks AC and HS with Humalog correction dose a c  And HS    Tobacco abuse  Assessment & Plan  Give nicotine 7 mg transdermal daily    Hyperlipidemia  Assessment & Plan  Substitute Pravachol 80 mg p o  Daily for pre-hospital Zocor    GERD (gastroesophageal reflux disease)  Assessment & Plan  Continue pre-hospital Protonix 40 mg p o  Daily    Essential hypertension  Assessment & Plan  Continue pre-hospital Toprol XL 25 mg p o  Daily and resume lisinopril 40 mg p o  Daily      VTE Prophylaxis: Heparin  Code Status:  Level 1  POLST: There is no POLST form on file for this patient (pre-hospital)  Discussion with family:  Phone conversation with wife, diagnosis and treatment plan reviewed all questions answered    Anticipated Length of Stay:  Patient will be admitted on an Observation basis with an anticipated length of stay of  < 2 midnights  Justification for Hospital Stay:  Chest pain rule out MI requiring further cardiac evaluation    Chief Complaint:   Chest pain x1 day    History of Present Illness:    Junior Tanner is a 75 y o  male who presents with chest pain x1 day    Patient is a somewhat difficult historian initially presented the ER complaining of abdominal pain which then turned into left anterior lateral chest wall pain  Patient states that this began suddenly last evening awakening him from sleep the when questioned further he states that this is been ongoing for several months and recently had a colonoscopy and an endoscopy and according to wife was told he had a [address]  Patient is complaining of abdominal distention and tenderness of such located left upper quadrant and feeling gassy  Patient denies any palpitations, syncope, lightheadedness, nausea, vomiting or diarrhea  Patient reports his pain is 8/10 and sharp and stabbing in nature without radiation and no aggravating or alleviating factors  Patient denies any cardiac history he does not currently see a cardiologist and has never had a stress test or an echocardiogram done  Patient does have significant cardiac risk factors include non-insulin-dependent diabetes, current smoker, hypercholesterolemia and hypertension  Review of Systems:  Review of Systems   Constitutional: Negative for chills and fever  Respiratory: Negative for cough, shortness of breath and wheezing  Gastrointestinal: Positive for abdominal distention and abdominal pain  Negative for diarrhea, nausea and vomiting  Genitourinary: Negative for dysuria, frequency, hematuria and urgency  Neurological: Negative for dizziness, syncope and light-headedness  All other systems reviewed and are negative  Past Medical and Surgical History:   Past Medical History:   Diagnosis Date    Asthma     Diabetes mellitus ([facility] [address] )     GERD (gastroesophageal reflux disease)     Hypertension        Past Surgical History:   Procedure Laterality Date    HERNIA REPAIR      umbilical       Meds/Allergies:  Prior to Admission medications    Medication Sig Start Date End Date Taking?  Authorizing Provider   aspirin 81 mg chewable tablet Chew 81 mg 1/19/18 3/23/20 Yes Historical Provider, MD   glipiZIDE (GLUCOTROL) 5 mg tablet 1 tab with dinner  10/15/18  Yes Historical Provider, MD   metFORMIN (GLUCOPHAGE) 500 mg tablet Take 500 mg by mouth 10/15/18  Yes Historical Provider, MD   metoprolol succinate (TOPROL-XL) 25 mg 24 hr tablet Take 25 mg by mouth 10/15/18  Yes Historical Provider, MD   pantoprazole (PROTONIX) 40 mg tablet take 1 tablet by mouth once daily 9/5/18  Yes Historical Provider, MD   sertraline (ZOLOFT) 50 mg tablet 1 5 tab daily  10/15/18  Yes Historical Provider, MD   simvastatin (ZOCOR) 40 mg tablet Take 40 mg by mouth 10/15/18  Yes Historical Provider, MD   gabapentin (NEURONTIN) 300 mg capsule Take 300 mg by mouth 11/13/18 11/13/19  Historical Provider, MD   lisinopril (ZESTRIL) 40 mg tablet Take 40 mg by mouth 10/15/18 10/15/19  Historical Provider, MD   tobramycin-dexamethasone Jj Sabianist) ophthalmic suspension Administer 1 drop into the left eye every 4 (four) hours while awake for 7 days  Patient not taking: Reported on 3/24/2020 1/27/19 2/3/19  Andrews Hernandez MD     I have reviewed home medications with patient personally      Allergies: No Known Allergies    Social History:  Marital Status: /Civil Union   Occupation:  Retired  Patient Pre-hospital Living Situation:  Resides at home with multiple family members  Patient Pre-hospital Level of Mobility:  Full without assist  Patient Pre-hospital Diet Restrictions:  Diabetic  Substance Use History:   Social History     Substance and Sexual Activity   Alcohol Use Yes    Frequency: 2-3 times a week    Comment: occassionally      Social History     Tobacco Use   Smoking Status Heavy Tobacco Smoker    Packs/day: 0 50   Smokeless Tobacco Never Used     Social History     Substance and Sexual Activity   Drug Use No       Family History:  I have reviewed the patients family history    Physical Exam:   Vitals:   Blood Pressure: 131/81 (03/24/20 0247)  Pulse: 81 (03/24/20 0247)  Temperature: (!) 97 3 °F (36 3 °C) (03/24/20 0247)  Temp Source: Temporal (03/24/20 0247)  Respirations: (!) 23 (03/24/20 0247)  Height: 5' 10" (177 8 cm) (03/24/20 0238)  Weight - Scale: 92 1 kg (203 lb 0 7 oz) (03/24/20 0238)  SpO2: 93 % (03/24/20 0247)    Physical Exam   Constitutional: He is oriented to person, place, and time  He appears well-developed and well-nourished  HENT:   Head: Normocephalic and atraumatic  Mouth/Throat: No oropharyngeal exudate  Eyes: Pupils are equal, round, and reactive to light  EOM are normal  No scleral icterus  Neck: Normal range of motion  Neck supple  No JVD present  Cardiovascular: Normal rate, regular rhythm and normal heart sounds  No murmur heard  Pulmonary/Chest: Effort normal and breath sounds normal  No respiratory distress  He has no wheezes  He has no rales  Abdominal: Soft  Bowel sounds are normal  He exhibits distension  There is tenderness in the left upper quadrant  There is no rebound and no guarding  Tympanic   Musculoskeletal: Normal range of motion  He exhibits no edema  Lymphadenopathy:     He has no cervical adenopathy  Neurological: He is alert and oriented to person, place, and time  Skin: Skin is warm and dry  No rash noted  No erythema  Psychiatric: He has a normal mood and affect  His behavior is normal    Nursing note and vitals reviewed  Additional Data:   Lab Results: I have personally reviewed pertinent reports      Results from last 7 days   Lab Units 03/23/20  2314   WBC Thousand/uL 8 30   HEMOGLOBIN g/dL 10 7*   HEMATOCRIT % 34 2*   PLATELETS Thousands/uL 288   NEUTROS PCT % 62   LYMPHS PCT % 28   MONOS PCT % 9   EOS PCT % 1     Results from last 7 days   Lab Units 03/23/20  2314   POTASSIUM mmol/L 4 7   CHLORIDE mmol/L 104   CO2 mmol/L 26   BUN mg/dL 22   CREATININE mg/dL 1 37*   CALCIUM mg/dL 8 8   ALK PHOS U/L 49*   ALT U/L 20   AST U/L 31         Results from last 7 days   Lab Units 03/24/20  0254   POC GLUCOSE mg/dl 167* Imaging: I have personally reviewed pertinent reports  PE Study with CT abdomen & pelvis with contrast   Final Result by Pranav Latham MD (03/24 0059)      Limited CTA of the chest secondary to the contrast bolus demonstrates no gross evidence of an intraluminal filling defect within the proximal pulmonary artery branches to suggest an acute pulmonary embolus  However, evaluation for distal subsegmental    emboli was limited  No infiltrate or pleural effusion  No acute intra-abdominal abnormality  No free air or free fluid  Scattered colonic diverticulosis with no inflammatory changes present to suggest acute diverticulitis  Workstation performed: [id number]             EKG, Pathology, and Other Studies Reviewed on Admission:   · EKG:  Normal sinus rhythm rate of 78 without ischemia    NetAccess / Epic Records Reviewed: Yes     ** Please Note: This note has been constructed using a voice recognition system   **

## 2020-03-24 NOTE — SOCIAL WORK
Evaluated the pt at the bedside  Pt was admitted to the hospital for chest pain  Explained the role of CM and the options of discharge planning with the pt  Pt states he lives with his spouse in a one story home with 2 JONN  Pt indicated he is completely independent and able to drive  Pt has no DME at home  Pt uses [employer] in [place]  Pt states his spouse will transport him home upon discharge  Explained the Observation level of care with the pt who verbalized understanding of same  Patient/caregiver received discharge checklist   Content reviewed  Patient/caregiver encouraged to participate in discharge plan of care prior to discharge home  CM reviewed d/c planning process including the following: identifying help at home, patient preference for d/c planning needs, availability of treatment team to discuss questions or concerns patient and/or family may have regarding understanding medications and recognizing signs and symptoms once discharged  CM also encouraged patient to follow up with all recommended appointments after discharge  Patient advised of importance for patient and family to participate in managing patients medical well being

## 2020-03-24 NOTE — NURSING NOTE
C/o of pain in r side of abdomen this am @ 6435 #4 and tylenol given with little results, S  Birdsal aware and saw pt at 1000 and orders received, morphine given as ordered

## 2020-03-24 NOTE — ED PROCEDURE NOTE
PROCEDURE  ECG 12 Lead Documentation Only  Date/Time: 3/23/2020 11:25 PM  Performed by: Sarah Eid MD  Authorized by: Sarah Eid MD     Indications / Diagnosis:  Chest pain  ECG reviewed by me, the ED Provider: yes    Patient location:  ED  Previous ECG:     Comparison to cardiac monitor: Yes    Interpretation:     Interpretation: non-specific    Rate:     ECG rate:  78    ECG rate assessment: normal    Rhythm:     Rhythm: sinus rhythm    Ectopy:     Ectopy: none    QRS:     QRS axis:  Normal    QRS intervals:  Normal  Conduction:     Conduction: normal    ST segments:     ST segments:  Non-specific  T waves:     T waves: non-specific           Sarah Eid MD  03/23/20 9555

## 2020-03-24 NOTE — ASSESSMENT & PLAN NOTE
Lab Results   Component Value Date    HGBA1C 6 8 (H) 10/25/2019       Recent Labs     03/24/20  0254 03/24/20  0833 03/24/20  1052   POCGLU 167* 116 142*       Blood Sugar Average: Last 72 hrs:  (P) 055 [id number]     · Place on [facility] step 2 diet  · hold pre-hospital oral antihyperglycemics  · obtain Accu-Cheks AC and HS with Humalog correction dose a c   And HS

## 2020-03-24 NOTE — CONSULTS
Consultation - GI   Emigdio Beverly 75 y o  male MRN: [id number]  Unit/Bed#: -01 Encounter: [id number]      Assessment/Plan     Assessment:  Iron deficiency anemia, left upper quadrant pain, tender spleen tip, abdominal distension  Plan:  Abdominal MRI with MRCP with attention to pancreas and spleen  History of Present Illness   Physician Requesting Consult: Miguel Hughes MD  Reason for Consult / Principal Problem:  Left upper quadrant pain  Hx and PE limited by:   HPI: Emigdio Beverly is a 75y o  year old male who presents with 1 day of increasing left upper quadrant pain  The patient reportedly has had this pain in the past but it was much more severe yesterday  He has a history of iron deficiency anemia  He had both EGD and colonoscopy done approximately 3 weeks ago  There is a report of a hiatal hernia by EGD and benign colon polyps by colonoscopy, according to the patient  I can not get these results currently  The patient has also had abdominal distention  He is passing gas but not stool through the colon  He denies rectal bleeding or diarrhea  There has been some mild nausea but no vomiting  Abdominal CT as well as abdominal and chest CT looking for pulmonary embolus did not reveal any splenic pathology  Lipase level was mildly elevated today  Consults    Review of Systems   Constitutional: Positive for activity change, appetite change and fatigue  HENT: Negative  Eyes: Negative  Respiratory: Positive for chest tightness  Cardiovascular: Positive for chest pain  Gastrointestinal: Positive for abdominal distention, abdominal pain (Left upper quadrant) and nausea  Endocrine: Negative  Genitourinary: Negative  Musculoskeletal: Positive for back pain  Neurological: Negative  Hematological: Negative  Psychiatric/Behavioral: Negative          Historical Information   Past Medical History:   Diagnosis Date    Asthma     Diabetes mellitus ([facility] [address] )     GERD (gastroesophageal reflux disease)     Hypertension      Past Surgical History:   Procedure Laterality Date    HERNIA REPAIR      umbilical     Social History   Social History     Substance and Sexual Activity   Alcohol Use Yes    Frequency: 2-3 times a week    Comment: occassionally      Social History     Substance and Sexual Activity   Drug Use No     E-Cigarette/Vaping    E-Cigarette Use Never User      E-Cigarette/Vaping Substances     Social History     Tobacco Use   Smoking Status Heavy Tobacco Smoker    Packs/day: 0 50   Smokeless Tobacco Never Used     Family History: non-contributory    Meds/Allergies   all current active meds have been reviewed    No Known Allergies   Current Facility-Administered Medications   Medication Dose Route Frequency Provider Last Rate Last Dose    acetaminophen (TYLENOL) tablet 650 mg  650 mg Oral Q4H PRN Júnior Day PA-C   650 mg at 03/24/20 0833    aluminum-magnesium hydroxide-simethicone (MYLANTA) 200-200-20 mg/5 mL oral suspension 30 mL  30 mL Oral Q4H PRN TITA Meng        aspirin chewable tablet 81 mg  81 mg Oral Daily Júnior Day PA-C   81 mg at 03/24/20 6205    heparin (porcine) subcutaneous injection 5,000 Units  5,000 Units Subcutaneous [facility] Júnior Day PA-C   5,000 Units at 03/24/20 1445    insulin lispro (HumaLOG) 100 units/mL subcutaneous injection 1-6 Units  1-6 Units Subcutaneous TID AC Júnior Day PA-C        insulin lispro (HumaLOG) 100 units/mL subcutaneous injection 1-6 Units  1-6 Units Subcutaneous HS Júnior Day PA-C        lisinopril (ZESTRIL) tablet 40 mg  40 mg Oral Daily Júnior Day PA-C   40 mg at 03/24/20 0831    metoprolol succinate (TOPROL-XL) 24 hr tablet 25 mg  25 mg Oral Daily Júnior Day PA-C   25 mg at 03/24/20 6309    morphine injection 2 mg  2 mg Intravenous Q4H PRN TITA Meng   2 mg at 03/24/20 1046    nicotine (NICODERM CQ) 7 mg/24hr TD 24 hr patch 1 patch  1 patch Transdermal Daily Kari Self PA-C        nitroglycerin (NITROSTAT) SL tablet 0 4 mg  0 4 mg Sublingual Q5 Min PRN Kari Self PA-C        ondansetron [facility]) injection 4 mg  4 mg Intravenous Q6H PRN Kari Self PA-C        pantoprazole (PROTONIX) EC tablet 40 mg  40 mg Oral Early Morning Kari Self PA-C   40 mg at 03/24/20 0546    pravastatin (PRAVACHOL) tablet 80 mg  80 mg Oral Daily With Katty Lamas PA-C        sertraline (ZOLOFT) tablet 75 mg  75 mg Oral Daily Kari Self PA-C   75 mg at 03/24/20 0112    sucralfate (CARAFATE) oral suspension 1,000 mg  1,000 mg Oral 4x Daily (AC & HS) TITA Ramos   1,000 mg at 03/24/20 1046       Objective    Vitals:    03/24/20 0247 03/24/20 0754 03/24/20 0832 03/24/20 1158   BP: 131/81 128/96 128/96 136/82   BP Location: Left arm Left arm  Left arm   Pulse: 81 76 76 76   Resp: (!) 23 (!) 24  22   Temp: (!) 97 3 °F (36 3 °C) 97 6 °F (36 4 °C)  97 8 °F (36 6 °C)   TempSrc: Temporal Temporal  Temporal   SpO2: 93% 94%  93%   Weight:       Height:             Intake/Output Summary (Last 24 hours) at 3/24/2020 1530  Last data filed at 3/24/2020 1208  Gross per 24 hour   Intake 1480 ml   Output    Net 1480 ml       Invasive Devices:   Peripheral IV 03/23/20 Left Forearm (Active)   Site Assessment Clean;Dry; Intact 3/24/2020  4:00 AM   Dressing Type Transparent 3/24/2020  4:00 AM   Line Status Blood return noted; Flushed;Capped 3/24/2020  4:00 AM   Dressing Status Clean;Dry; Intact 3/24/2020  4:00 AM       Physical Exam   Constitutional: He is oriented to person, place, and time  He appears well-developed and well-nourished  HENT:   Head: Normocephalic and atraumatic  Neck: Normal range of motion  Neck supple  Cardiovascular: Normal rate, regular rhythm and normal heart sounds  Pulmonary/Chest: Effort normal    Abdominal: Soft  Bowel sounds are normal  He exhibits distension  There is tenderness (Spleen tip tender)  Musculoskeletal: Normal range of motion  Neurological: He is alert and oriented to person, place, and time  Skin: Skin is warm and dry  Lab Results: I have personally reviewed pertinent reports  Imaging Studies: I have personally reviewed pertinent reports  EKG, Pathology, and Other Studies: I have personally reviewed pertinent reports  VTE Prophylaxis: Heparin    Counseling / Coordination of Care  Total floor / unit time spent today 75 minutes  Greater than 50% of total time was spent with the patient and / or family counseling and / or coordination of care  A description of the counseling / coordination of care:  Case discussed with hospital physician/physician assistant

## 2020-03-25 LAB
ALBUMIN SERPL BCP-MCNC: 3.6 G/DL (ref 3.5–5)
ALP SERPL-CCNC: 70 U/L (ref 46–116)
ALT SERPL W P-5'-P-CCNC: 29 U/L (ref 12–78)
ANION GAP SERPL CALCULATED.3IONS-SCNC: 5 MMOL/L (ref 4–13)
AST SERPL W P-5'-P-CCNC: 29 U/L (ref 5–45)
ATRIAL RATE: 72 BPM
ATRIAL RATE: 78 BPM
BILIRUB SERPL-MCNC: 0.37 MG/DL (ref 0.2–1)
BUN SERPL-MCNC: 12 MG/DL (ref 5–25)
CALCIUM SERPL-MCNC: 8.8 MG/DL (ref 8.3–10.1)
CHLORIDE SERPL-SCNC: 106 MMOL/L (ref 100–108)
CHOLEST SERPL-MCNC: 150 MG/DL (ref 50–200)
CO2 SERPL-SCNC: 25 MMOL/L (ref 21–32)
CREAT SERPL-MCNC: 0.98 MG/DL (ref 0.6–1.3)
ERYTHROCYTE [DISTWIDTH] IN BLOOD BY AUTOMATED COUNT: 19.2 % (ref 11.6–15.1)
GFR SERPL CREATININE-BSD FRML MDRD: 80 ML/MIN/1.73SQ M
GLUCOSE SERPL-MCNC: 108 MG/DL (ref 65–140)
GLUCOSE SERPL-MCNC: 122 MG/DL (ref 65–140)
GLUCOSE SERPL-MCNC: 125 MG/DL (ref 65–140)
GLUCOSE SERPL-MCNC: 128 MG/DL (ref 65–140)
GLUCOSE SERPL-MCNC: 135 MG/DL (ref 65–140)
GLUCOSE SERPL-MCNC: 148 MG/DL (ref 65–140)
HCT VFR BLD AUTO: 34.1 % (ref 36.5–49.3)
HDLC SERPL-MCNC: 85 MG/DL
HGB BLD-MCNC: 10.2 G/DL (ref 12–17)
LDLC SERPL CALC-MCNC: 38 MG/DL (ref 0–100)
LIPASE SERPL-CCNC: 185 U/L (ref 73–393)
MCH RBC QN AUTO: 23.4 PG (ref 26.8–34.3)
MCHC RBC AUTO-ENTMCNC: 29.9 G/DL (ref 31.4–37.4)
MCV RBC AUTO: 78 FL (ref 82–98)
P AXIS: 58 DEGREES
P AXIS: 68 DEGREES
PLATELET # BLD AUTO: 238 THOUSANDS/UL (ref 149–390)
PMV BLD AUTO: 9.2 FL (ref 8.9–12.7)
POTASSIUM SERPL-SCNC: 4.5 MMOL/L (ref 3.5–5.3)
PR INTERVAL: 150 MS
PR INTERVAL: 154 MS
PROT SERPL-MCNC: 7.9 G/DL (ref 6.4–8.2)
QRS AXIS: -67 DEGREES
QRS AXIS: -70 DEGREES
QRSD INTERVAL: 92 MS
QRSD INTERVAL: 94 MS
QT INTERVAL: 376 MS
QT INTERVAL: 384 MS
QTC INTERVAL: 420 MS
QTC INTERVAL: 428 MS
RBC # BLD AUTO: 4.36 MILLION/UL (ref 3.88–5.62)
SODIUM SERPL-SCNC: 136 MMOL/L (ref 136–145)
T WAVE AXIS: 26 DEGREES
T WAVE AXIS: 48 DEGREES
TRIGL SERPL-MCNC: 135 MG/DL
VENTRICULAR RATE: 72 BPM
VENTRICULAR RATE: 78 BPM
WBC # BLD AUTO: 7.68 THOUSAND/UL (ref 4.31–10.16)

## 2020-03-25 PROCEDURE — 85027 COMPLETE CBC AUTOMATED: CPT | Performed by: INTERNAL MEDICINE

## 2020-03-25 PROCEDURE — 93010 ELECTROCARDIOGRAM REPORT: CPT | Performed by: INTERNAL MEDICINE

## 2020-03-25 PROCEDURE — 99222 1ST HOSP IP/OBS MODERATE 55: CPT | Performed by: INTERNAL MEDICINE

## 2020-03-25 PROCEDURE — 99232 SBSQ HOSP IP/OBS MODERATE 35: CPT | Performed by: INTERNAL MEDICINE

## 2020-03-25 PROCEDURE — 80053 COMPREHEN METABOLIC PANEL: CPT | Performed by: INTERNAL MEDICINE

## 2020-03-25 PROCEDURE — 82948 REAGENT STRIP/BLOOD GLUCOSE: CPT

## 2020-03-25 PROCEDURE — 80061 LIPID PANEL: CPT | Performed by: INTERNAL MEDICINE

## 2020-03-25 PROCEDURE — 83690 ASSAY OF LIPASE: CPT | Performed by: INTERNAL MEDICINE

## 2020-03-25 RX ORDER — POLYETHYLENE GLYCOL 3350 17 G/17G
17 POWDER, FOR SOLUTION ORAL DAILY
Status: DISCONTINUED | OUTPATIENT
Start: 2020-03-25 | End: 2020-03-27 | Stop reason: HOSPADM

## 2020-03-25 RX ORDER — ACETAMINOPHEN 325 MG/1
975 TABLET ORAL EVERY 8 HOURS SCHEDULED
Status: DISCONTINUED | OUTPATIENT
Start: 2020-03-25 | End: 2020-03-27 | Stop reason: HOSPADM

## 2020-03-25 RX ORDER — SIMETHICONE 80 MG
80 TABLET,CHEWABLE ORAL
Status: DISCONTINUED | OUTPATIENT
Start: 2020-03-25 | End: 2020-03-27 | Stop reason: HOSPADM

## 2020-03-25 RX ORDER — CYCLOBENZAPRINE HCL 10 MG
10 TABLET ORAL 3 TIMES DAILY PRN
Status: DISCONTINUED | OUTPATIENT
Start: 2020-03-25 | End: 2020-03-27 | Stop reason: HOSPADM

## 2020-03-25 RX ORDER — SODIUM CHLORIDE 9 MG/ML
75 INJECTION, SOLUTION INTRAVENOUS CONTINUOUS
Status: DISCONTINUED | OUTPATIENT
Start: 2020-03-25 | End: 2020-03-25

## 2020-03-25 RX ADMIN — SIMETHICONE CHEW TAB 80 MG 80 MG: 80 TABLET ORAL at 21:47

## 2020-03-25 RX ADMIN — LISINOPRIL 40 MG: 20 TABLET ORAL at 11:04

## 2020-03-25 RX ADMIN — ASPIRIN 81 MG 81 MG: 81 TABLET ORAL at 11:04

## 2020-03-25 RX ADMIN — PRAVASTATIN SODIUM 80 MG: 80 TABLET ORAL at 15:46

## 2020-03-25 RX ADMIN — IRON SUCROSE 100 MG: 20 INJECTION, SOLUTION INTRAVENOUS at 15:30

## 2020-03-25 RX ADMIN — ACETAMINOPHEN 975 MG: 325 TABLET ORAL at 21:45

## 2020-03-25 RX ADMIN — LIDOCAINE 1 PATCH: 50 PATCH CUTANEOUS at 11:04

## 2020-03-25 RX ADMIN — SIMETHICONE CHEW TAB 80 MG 80 MG: 80 TABLET ORAL at 15:46

## 2020-03-25 RX ADMIN — INSULIN LISPRO 1 UNITS: 100 INJECTION, SOLUTION INTRAVENOUS; SUBCUTANEOUS at 00:05

## 2020-03-25 RX ADMIN — PANTOPRAZOLE SODIUM 40 MG: 40 TABLET, DELAYED RELEASE ORAL at 05:34

## 2020-03-25 RX ADMIN — SERTRALINE HYDROCHLORIDE 75 MG: 50 TABLET ORAL at 11:04

## 2020-03-25 RX ADMIN — DOCUSATE SODIUM 100 MG: 100 CAPSULE, LIQUID FILLED ORAL at 11:04

## 2020-03-25 RX ADMIN — BUDESONIDE AND FORMOTEROL FUMARATE DIHYDRATE 2 PUFF: 80; 4.5 AEROSOL RESPIRATORY (INHALATION) at 11:04

## 2020-03-25 RX ADMIN — SIMETHICONE CHEW TAB 80 MG 80 MG: 80 TABLET ORAL at 13:27

## 2020-03-25 RX ADMIN — BUDESONIDE AND FORMOTEROL FUMARATE DIHYDRATE 2 PUFF: 80; 4.5 AEROSOL RESPIRATORY (INHALATION) at 22:00

## 2020-03-25 RX ADMIN — CYCLOBENZAPRINE HYDROCHLORIDE 10 MG: 10 TABLET, FILM COATED ORAL at 15:46

## 2020-03-25 RX ADMIN — HEPARIN SODIUM 5000 UNITS: 5000 INJECTION INTRAVENOUS; SUBCUTANEOUS at 13:35

## 2020-03-25 RX ADMIN — SODIUM CHLORIDE 75 ML/HR: 0.9 INJECTION, SOLUTION INTRAVENOUS at 11:02

## 2020-03-25 RX ADMIN — HEPARIN SODIUM 5000 UNITS: 5000 INJECTION INTRAVENOUS; SUBCUTANEOUS at 21:45

## 2020-03-25 RX ADMIN — POLYETHYLENE GLYCOL 3350 17 G: 17 POWDER, FOR SOLUTION ORAL at 11:04

## 2020-03-25 RX ADMIN — CYCLOBENZAPRINE HYDROCHLORIDE 10 MG: 10 TABLET, FILM COATED ORAL at 19:50

## 2020-03-25 RX ADMIN — DOCUSATE SODIUM 100 MG: 100 CAPSULE, LIQUID FILLED ORAL at 17:41

## 2020-03-25 RX ADMIN — MORPHINE SULFATE 2 MG: 2 INJECTION, SOLUTION INTRAMUSCULAR; INTRAVENOUS at 05:33

## 2020-03-25 RX ADMIN — MORPHINE SULFATE 2 MG: 2 INJECTION, SOLUTION INTRAMUSCULAR; INTRAVENOUS at 11:19

## 2020-03-25 RX ADMIN — METOPROLOL SUCCINATE 25 MG: 25 TABLET, EXTENDED RELEASE ORAL at 11:04

## 2020-03-25 NOTE — EMTALA/ACUTE CARE TRANSFER
[address] [facility] SURGICAL UNIT  [address] [facility] [address] [phone]  Dept: [phone]      ACUTE CARE TRANSFER CONSENT    NAME Alysia MCDANIEL 1955                              MRN [id number]    I have been informed of my rights regarding examination, treatment, and transfer   by Dr Beatris Estrada MD    Benefits: Specialized equipment and/or services available at the receiving facility (Include comment)________________________(EUS)    Risks: Potential for delay in receiving treatment      Transfer Request:  I acknowledge that my medical condition has been evaluated and explained to me by the treating physician or other qualified medical person and/or my attending physician who has recommended and offered to me further medical examination and treatment  I understand the Hospital's obligation with respect to the treatment and stabilization of my medical condition  I nevertheless request to be transferred  I release the Hospital, the doctor, and any other persons caring for me from all responsibility or liability for any injury or ill effects that may result from my transfer and agree to accept all responsibility for the consequences of my choice to transfer, rather than receive stabilizing treatment at the Hospital  I understand that because the transfer is my request, my insurance may not provide reimbursement for the services  The Hospital will assist and direct me and my family in how to make arrangements for transfer, but the hospital is not liable for any fees charged by the transport service  In spite of this understanding, I refuse to consent to further medical examination and treatment which has been offered to me, and request transfer to    I authorize the performance of emergency medical procedures and treatments upon me in both transit and upon arrival at the receiving facility    Additionally, I authorize the release of any and all medical records to the receiving facility and request they be transported with me, if possible  I authorize the performance of emergency medical procedures and treatments upon me in both transit and upon arrival at the receiving facility  Additionally, I authorize the release of any and all medical records to the receiving facility and request they be transported with me, if possible  I understand that the safest mode of transportation during a medical emergency is an ambulance and that the Hospital advocates the use of this mode of transport  Risks of traveling to the receiving facility by car, including absence of medical control, life sustaining equipment, such as oxygen, and medical personnel has been explained to me and I fully understand them  (YAMIL CORRECT BOX BELOW)  [  ]  I consent to the stated transfer and to be transported by ambulance/helicopter  [  ]  I consent to the stated transfer, but refuse transportation by ambulance and accept full responsibility for my transportation by car  I understand the risks of non-ambulance transfers and I exonerate the Hospital and its staff from any deterioration in my condition that results from this refusal     X___________________________________________    DATE  20  TIME________  Signature of patient or legally responsible individual signing on patient behalf           RELATIONSHIP TO PATIENT_________________________          Provider Certification    NAME Josh Martinez                                         1955                              MRN [id number]    A medical screening exam was performed on the above named patient    Based on the examination:    Condition Necessitating Transfer EGD with EUS     Patient Condition: The patient has been stabilized such that within reasonable medical probability, no material deterioration of the patient condition or the condition of the unborn child(yaya) is likely to result from the transfer    Reason for Transfer: Level of Care needed not available at this facility    Transfer Requirements: Facility     · Space available and qualified personnel available for treatment as acknowledged by    · Agreed to accept transfer and to provide appropriate medical treatment as acknowledged by          · Appropriate medical records of the examination and treatment of the patient are provided at the time of transfer   [address], [address] _______  · Transfer will be performed by qualified personnel from    and appropriate transfer equipment as required, including the use of necessary and appropriate life support measures  Provider Certification: I have examined the patient and explained the following risks and benefits of being transferred/refusing transfer to the patient/family:  General risk, such as traffic hazards, adverse weather conditions, rough terrain or turbulence, possible failure of equipment (including vehicle or aircraft), or consequences of actions of persons outside the control of the transport personnel      Based on these reasonable risks and benefits to the patient and/or the unborn child(yaya), and based upon the information available at the time of the patients examination, I certify that the medical benefits reasonably to be expected from the provision of appropriate medical treatments at another medical facility outweigh the increasing risks, if any, to the individuals medical condition, and in the case of labor to the unborn child, from effecting the transfer      X____________________________________________ DATE 03/24/20        TIME_______      ORIGINAL - SEND TO MEDICAL RECORDS   COPY - SEND WITH PATIENT DURING TRANSFER

## 2020-03-25 NOTE — DISCHARGE SUMMARY
Discharge- Dipak Villalta 1955, 75 y o  male MRN: [id number]    Unit/Bed#: -01 Encounter: [id number]    Primary Care Provider: Christy Brewster MD   Date and time admitted to hospital: 3/23/2020 10:42 PM        * Left upper quadrant pain  Assessment & Plan  · Unclear etiology MRI shows suspected mass within the distal gastric body  · Worse with food   · Patient had an EGD and colonoscopy 3/3/20- results are unremarkable except chronic inflammation on EGD per patient     · GI input appreciated   · Concerning about splenomegaly with iron deficiency anemia   · Pain is improved with pain medication  · The patient will be transferred to [facility] for EGD and EUS per GI recommendation      Iron deficiency anemia  Assessment & Plan  · Microcytic hypochromic anemia  · I spoke with the wife and she states that the patient was on iron supplement prior however stopped a few weeks ago due to constipation  · Hemoglobin stable at this time  · Suspected that this may be related to malignancy process    Tobacco abuse  Assessment & Plan  · nicotine transdermal daily    Other chest pain  Assessment & Plan  · This is resolved; suspected this be stemmed from his LUQ pain   · ACS is ruled out, no ischemic changes on EKG  · Troponin x 3- negative   · Continue aspirin, lisinopril, statin and metoprolol        Mixed hyperlipidemia  Assessment & Plan  · Continue with statin       Gastroesophageal reflux disease without esophagitis  Assessment & Plan  · Continue PPI      Essential hypertension  Assessment & Plan  · Continue Toprol XL and lisinopril      Controlled type 2 diabetes mellitus without complication, without long-term current use of insulin [facility])  Assessment & Plan  Lab Results   Component Value Date    HGBA1C 6 8 (H) 10/25/2019       Recent Labs     03/24/20  0254 03/24/20  0833 03/24/20  1052   POCGLU 167* 116 142*       Blood Sugar Average: Last 72 hrs:  (P) 562 [id number]     · Place on [facility] step 2 diet  · hold pre-hospital oral antihyperglycemics  · obtain Accu-Cheks AC and HS with Humalog correction dose a c  And HS          Discharging Physician / Practitioner: TITA Sherman  PCP: Constantino Marina MD  Admission Date:   Admission Orders (From admission, onward)     Ordered        03/24/20 1544  Inpatient Admission  Once         03/24/20 0130  Place in Observation  Once                   Discharge Date: 03/24/20    Disposition:      Inpatient [address] at [facility] to [address] [place] [address] SNF:   · Not Applicable to this Patient - Not Applicable to this Patient    Reason for Admission: abdominal pain     Discharge Diagnoses:     Please see assessment and plan section above for further details regarding discharge diagnoses  Resolved Problems  Date Reviewed: 3/24/2020    None          Consultations During Hospital Stay:  · GI     Procedures Performed:   · None      Medication Adjustments and Discharge Medications:  · Summary of Medication Adjustments made as a result of this hospitalization: none   · Medication Dosing Tapers - Please refer to Discharge Medication List for details on any medication dosing tapers (if applicable to patient)  · Medications being temporarily held (include recommended restart time): none   · Discharge Medication List: See after visit summary for reconciled discharge medications  Wound Care Recommendations:  When applicable, please see wound care section of After Visit Summary  Diet Recommendations at Discharge:  Diet -        Diet Orders   (From admission, onward)             Start     Ordered    03/24/20 1543  Diet Baljit/CHO Controlled; Consistent Carbohydrate Diet Level 2 (5 carb servings/75 grams CHO/meal);  Lo Fat  Diet effective now     Question Answer Comment   Diet Type Baljit/CHO Controlled    Baljit/CHO Controlled Consistent Carbohydrate Diet Level 2 (5 carb servings/75 grams CHO/meal)    Other Restriction(s): Lo Fat    RD to adjust diet per protocol? Yes        03/24/20 1542                Instructions for any Catheters / Lines Present at Discharge (including removal date, if applicable): n/a     Significant Findings / Test Results:     MRI abdomen w wo contrast and mrcp   Final Result by Heladio Daniels MD (03/24 1913)      Overall, the study is markedly degraded by motion artifact on multiple sequences  Bilobed enhancing mass within the distal gastric body along the greater curvature with superior intraluminal and inferior exophytic components consistent with gastric neoplasia  Further evaluation with endoscopy and tissue sampling is recommended  No abnormality of the pancreas or spleen is identified  I personally discussed this study with Laquita Estrella on 3/24/2020 at 7:12 PM                   Workstation performed: [id number]         PE Study with CT abdomen & pelvis with contrast   Final Result by Pranav Latham MD (03/24 0059)      Limited CTA of the chest secondary to the contrast bolus demonstrates no gross evidence of an intraluminal filling defect within the proximal pulmonary artery branches to suggest an acute pulmonary embolus  However, evaluation for distal subsegmental    emboli was limited  No infiltrate or pleural effusion  No acute intra-abdominal abnormality  No free air or free fluid  Scattered colonic diverticulosis with no inflammatory changes present to suggest acute diverticulitis  Workstation performed: [id number]             Incidental Findings:   · Gastric mass      Test Results Pending at Discharge (will require follow up): · None      Outpatient Tests Requested:  · Follow up with PCP     Complications:    · None     Hospital Course:     Milli Paige is a 75 y o  male patient who originally presented to the hospital on 3/23/2020 due to abdominal pain/chest pain  Please refer to H&P for initial presenting complaint    In brief the patient was admitted initially for suspected chest pain rule out ACS  ACS was ruled out as the patient troponin are negative x3 without any EKG changes  During the assessment when the patient was admitted, it was found that the patient was actually having a left upper quadrant pain that was radiating to his left upper chest   The patient continues to have persistent left abdominal pain and subsequently gastroenterology evaluation was done  Initially his lipase level was normal repeated level was slightly elevated at 125  His CT scan abdomen and pelvis was done on admission and the result was unremarkable  Gastroenterology recommended to follow-up with MRI/MRCP  Please refer to results noted above  There is a suspected mass found on the distal gastric body and at this time GI is recommending EGD with EUS  The patient is being accepted at [facility] under internal medicine service and will be transfer for further evaluation  I discussed with his wife via phone and discussed the finding in details with the patient at bedside  Condition at Discharge: fair     Discharge Day Visit / Exam:     Subjective:  Having pain on LUQ worse with movement  Vitals: Blood Pressure: 142/83 (03/24/20 1917)  Pulse: 76 (03/24/20 1917)  Temperature: 99 9 °F (37 7 °C) (03/24/20 1917)  Temp Source: Tympanic (03/24/20 1917)  Respirations: 18 (03/24/20 1917)  Height: 5' 10" (177 8 cm) (03/24/20 0238)  Weight - Scale: 92 1 kg (203 lb 0 7 oz) (03/24/20 0238)  SpO2: 92 % (03/24/20 1917)  Exam:   Physical Exam   Constitutional: He is oriented to person, place, and time  He appears well-developed and well-nourished  No distress  HENT:   Head: Normocephalic and atraumatic  Mouth/Throat: Oropharynx is clear and moist    Eyes: Pupils are equal, round, and reactive to light  Conjunctivae and EOM are normal    Neck: Normal range of motion  Neck supple  No thyromegaly present     Cardiovascular: Normal rate, regular rhythm, normal heart sounds and intact distal pulses  Pulmonary/Chest: Effort normal and breath sounds normal  No respiratory distress  He has no wheezes  Abdominal: Soft  Bowel sounds are normal  He exhibits no distension  There is tenderness (LUQ)  Musculoskeletal: Normal range of motion  He exhibits no edema or deformity  Neurological: He is alert and oriented to person, place, and time  He has normal reflexes  No cranial nerve deficit  Skin: Skin is warm and dry  No erythema  Psychiatric: He has a normal mood and affect  His behavior is normal  Thought content normal    Vitals reviewed  Discussion with Family: wife via phone     Goals of Care Discussions:  · Code Status at Discharge: Level 1 - Full Code  · Were there any Goals of Care Discussions during Hospitalization?: Yes  · Results of any General Goals of Care Discussions: same    · POLST Completed: Yes   · If POLST Completed, Summary of POLST Agreement Provided Here: n/a    · OK to Rehospitalize if Needed? Yes    Discharge instructions/Information to patient and family:   See after visit summary section titled Discharge Instructions for information provided to patient and family  Planned Readmission: no       Discharge Statement:  I spent 40 minutes discharging the patient  This time was spent on the day of discharge  I had direct contact with the patient on the day of discharge  Greater than 50% of the total time was spent examining patient, answering all patient questions, arranging and discussing plan of care with patient as well as directly providing post-discharge instructions  Additional time then spent on discharge activities      ** Please Note: This note has been constructed using a voice recognition system **

## 2020-03-25 NOTE — NURSING NOTE
Pt is wondering if KA can see her this month. Pts ins will term at the end of the month. Please call.  [phone].   Pt transferred to [facility] per order  Pt transferred by [facility]  Pt transported via stretcher  Pt in stable condition  IV in place  Pt belongings with patient  Forms signed and sent with transport team  Report called into receiving RN

## 2020-03-25 NOTE — PLAN OF CARE
Problem: PAIN - ADULT  Goal: Verbalizes/displays adequate comfort level or baseline comfort level  Description  Interventions:  - Encourage patient to monitor pain and request assistance  - Assess pain using appropriate pain scale  - Administer analgesics based on type and severity of pain and evaluate response  - Implement non-pharmacological measures as appropriate and evaluate response  - Consider cultural and social influences on pain and pain management  - Notify physician/advanced practitioner if interventions unsuccessful or patient reports new pain  Outcome: Progressing     Problem: INFECTION - ADULT  Goal: Absence or prevention of progression during hospitalization  Description  INTERVENTIONS:  - Assess and monitor for signs and symptoms of infection  - Monitor lab/diagnostic results  - Monitor all insertion sites, i e  indwelling lines, tubes, and drains  - Monitor endotracheal if appropriate and nasal secretions for changes in amount and color  - [place] appropriate cooling/warming therapies per order  - Administer medications as ordered  - Instruct and encourage patient and family to use good hand hygiene technique  - Identify and instruct in appropriate isolation precautions for identified infection/condition  Outcome: Progressing  Goal: Absence of fever/infection during neutropenic period  Description  INTERVENTIONS:  - Monitor WBC    Outcome: Progressing     Problem: SAFETY ADULT  Goal: Patient will remain free of falls  Description  INTERVENTIONS:  - Assess patient frequently for physical needs  -  Identify cognitive and physical deficits and behaviors that affect risk of falls    -  [place] fall precautions as indicated by assessment   - Educate patient/family on patient safety including physical limitations  - Instruct patient to call for assistance with activity based on assessment  - Modify environment to reduce risk of injury  - Consider OT/PT consult to assist with strengthening/mobility  Outcome: Progressing  Goal: Maintain or return to baseline ADL function  Description  INTERVENTIONS:  -  Assess patient's ability to carry out ADLs; assess patient's baseline for ADL function and identify physical deficits which impact ability to perform ADLs (bathing, care of mouth/teeth, toileting, grooming, dressing, etc )  - Assess/evaluate cause of self-care deficits   - Assess range of motion  - Assess patient's mobility; develop plan if impaired  - Assess patient's need for assistive devices and provide as appropriate  - Encourage maximum independence but intervene and supervise when necessary  - Involve family in performance of ADLs  - Assess for home care needs following discharge   - Consider OT consult to assist with ADL evaluation and planning for discharge  - Provide patient education as appropriate  Outcome: Progressing  Goal: Maintain or return mobility status to optimal level  Description  INTERVENTIONS:  - Assess patient's baseline mobility status (ambulation, transfers, stairs, etc )    - Identify cognitive and physical deficits and behaviors that affect mobility  - Identify mobility aids required to assist with transfers and/or ambulation (gait belt, sit-to-stand, lift, walker, cane, etc )  - [place] fall precautions as indicated by assessment  - Record patient progress and toleration of activity level on Mobility SBAR; progress patient to next Phase/Stage  - Instruct patient to call for assistance with activity based on assessment  - Consider rehabilitation consult to assist with strengthening/weightbearing, etc   Outcome: Progressing     Problem: DISCHARGE PLANNING  Goal: Discharge to home or other facility with appropriate resources  Description  INTERVENTIONS:  - Identify barriers to discharge w/patient and caregiver  - Arrange for needed discharge resources and transportation as appropriate  - Identify discharge learning needs (meds, wound care, etc )  - Arrange for interpretive services to assist at discharge as needed  - Refer to Case Management Department for coordinating discharge planning if the patient needs post-hospital services based on physician/advanced practitioner order or complex needs related to functional status, cognitive ability, or social support system  Outcome: Progressing     Problem: Knowledge Deficit  Goal: Patient/family/caregiver demonstrates understanding of disease process, treatment plan, medications, and discharge instructions  Description  Complete learning assessment and assess knowledge base  Interventions:  - Provide teaching at level of understanding  - Provide teaching via preferred learning methods  Outcome: Progressing     Problem: Prexisting or High Potential for Compromised Skin Integrity  Goal: Skin integrity is maintained or improved  Description  INTERVENTIONS:  - Identify patients at risk for skin breakdown  - Assess and monitor skin integrity  - Assess and monitor nutrition and hydration status  - Monitor labs   - Assess for incontinence   - Turn and reposition patient  - Assist with mobility/ambulation  - Relieve pressure over bony prominences  - Avoid friction and shearing  - Provide appropriate hygiene as needed including keeping skin clean and dry  - Evaluate need for skin moisturizer/barrier cream  - Collaborate with interdisciplinary team   - Patient/family teaching  - Consider wound care consult   Outcome: Progressing     Problem: Potential for Falls  Goal: Patient will remain free of falls  Description  INTERVENTIONS:  - Assess patient frequently for physical needs  -  Identify cognitive and physical deficits and behaviors that affect risk of falls    -  [place] fall precautions as indicated by assessment   - Educate patient/family on patient safety including physical limitations  - Instruct patient to call for assistance with activity based on assessment  - Modify environment to reduce risk of injury  - Consider OT/PT consult to assist with strengthening/mobility  Outcome: Progressing

## 2020-03-25 NOTE — NURSING NOTE
Pt transferred to [facility] per order  Accepting is Dr Radha Fraga  Report given to Loco whaley, RN P9 floor  Pt transported via [employer]  Pt in stable condition upon discharge  Belongings with patient

## 2020-03-26 ENCOUNTER — ANESTHESIA EVENT (INPATIENT)
Dept: GASTROENTEROLOGY | Facility: HOSPITAL | Age: 65
DRG: 376 | End: 2020-03-26
Payer: MEDICARE

## 2020-03-26 ENCOUNTER — ANESTHESIA (INPATIENT)
Dept: GASTROENTEROLOGY | Facility: HOSPITAL | Age: 65
DRG: 376 | End: 2020-03-26
Payer: MEDICARE

## 2020-03-26 ENCOUNTER — APPOINTMENT (INPATIENT)
Dept: GASTROENTEROLOGY | Facility: HOSPITAL | Age: 65
DRG: 376 | End: 2020-03-26
Attending: INTERNAL MEDICINE
Payer: MEDICARE

## 2020-03-26 LAB
GLUCOSE SERPL-MCNC: 100 MG/DL (ref 65–140)
GLUCOSE SERPL-MCNC: 109 MG/DL (ref 65–140)
GLUCOSE SERPL-MCNC: 119 MG/DL (ref 65–140)
GLUCOSE SERPL-MCNC: 86 MG/DL (ref 65–140)

## 2020-03-26 PROCEDURE — 99232 SBSQ HOSP IP/OBS MODERATE 35: CPT | Performed by: INTERNAL MEDICINE

## 2020-03-26 PROCEDURE — 82948 REAGENT STRIP/BLOOD GLUCOSE: CPT

## 2020-03-26 PROCEDURE — 88305 TISSUE EXAM BY PATHOLOGIST: CPT | Performed by: PATHOLOGY

## 2020-03-26 PROCEDURE — 88341 IMHCHEM/IMCYTCHM EA ADD ANTB: CPT | Performed by: PATHOLOGY

## 2020-03-26 PROCEDURE — 88342 IMHCHEM/IMCYTCHM 1ST ANTB: CPT | Performed by: PATHOLOGY

## 2020-03-26 PROCEDURE — 88112 CYTOPATH CELL ENHANCE TECH: CPT | Performed by: PATHOLOGY

## 2020-03-26 PROCEDURE — 43238 EGD US FINE NEEDLE BX/ASPIR: CPT | Performed by: INTERNAL MEDICINE

## 2020-03-26 RX ORDER — SODIUM CHLORIDE 9 MG/ML
INJECTION, SOLUTION INTRAVENOUS CONTINUOUS PRN
Status: DISCONTINUED | OUTPATIENT
Start: 2020-03-26 | End: 2020-03-26 | Stop reason: SURG

## 2020-03-26 RX ORDER — PROPOFOL 10 MG/ML
INJECTION, EMULSION INTRAVENOUS AS NEEDED
Status: DISCONTINUED | OUTPATIENT
Start: 2020-03-26 | End: 2020-03-26 | Stop reason: SURG

## 2020-03-26 RX ADMIN — PROPOFOL 50 MG: 10 INJECTION, EMULSION INTRAVENOUS at 15:22

## 2020-03-26 RX ADMIN — PROPOFOL 50 MG: 10 INJECTION, EMULSION INTRAVENOUS at 15:42

## 2020-03-26 RX ADMIN — PHENYLEPHRINE HYDROCHLORIDE 200 MCG: 10 INJECTION INTRAVENOUS at 15:31

## 2020-03-26 RX ADMIN — DOCUSATE SODIUM 100 MG: 100 CAPSULE, LIQUID FILLED ORAL at 17:39

## 2020-03-26 RX ADMIN — SIMETHICONE CHEW TAB 80 MG 80 MG: 80 TABLET ORAL at 09:46

## 2020-03-26 RX ADMIN — PROPOFOL 50 MG: 10 INJECTION, EMULSION INTRAVENOUS at 15:26

## 2020-03-26 RX ADMIN — PHENYLEPHRINE HYDROCHLORIDE 200 MCG: 10 INJECTION INTRAVENOUS at 15:56

## 2020-03-26 RX ADMIN — PHENYLEPHRINE HYDROCHLORIDE 200 MCG: 10 INJECTION INTRAVENOUS at 15:34

## 2020-03-26 RX ADMIN — PROPOFOL 50 MG: 10 INJECTION, EMULSION INTRAVENOUS at 15:49

## 2020-03-26 RX ADMIN — PROPOFOL 50 MG: 10 INJECTION, EMULSION INTRAVENOUS at 15:33

## 2020-03-26 RX ADMIN — IRON SUCROSE 100 MG: 20 INJECTION, SOLUTION INTRAVENOUS at 10:11

## 2020-03-26 RX ADMIN — PHENYLEPHRINE HYDROCHLORIDE 100 MCG: 10 INJECTION INTRAVENOUS at 15:28

## 2020-03-26 RX ADMIN — PROPOFOL 50 MG: 10 INJECTION, EMULSION INTRAVENOUS at 15:38

## 2020-03-26 RX ADMIN — CYCLOBENZAPRINE HYDROCHLORIDE 10 MG: 10 TABLET, FILM COATED ORAL at 22:46

## 2020-03-26 RX ADMIN — PHENYLEPHRINE HYDROCHLORIDE 200 MCG: 10 INJECTION INTRAVENOUS at 15:33

## 2020-03-26 RX ADMIN — SODIUM CHLORIDE: 0.9 INJECTION, SOLUTION INTRAVENOUS at 15:13

## 2020-03-26 RX ADMIN — BUDESONIDE AND FORMOTEROL FUMARATE DIHYDRATE 2 PUFF: 80; 4.5 AEROSOL RESPIRATORY (INHALATION) at 23:16

## 2020-03-26 RX ADMIN — SERTRALINE HYDROCHLORIDE 75 MG: 50 TABLET ORAL at 09:47

## 2020-03-26 RX ADMIN — METOPROLOL SUCCINATE 25 MG: 25 TABLET, EXTENDED RELEASE ORAL at 09:46

## 2020-03-26 RX ADMIN — ASPIRIN 81 MG 81 MG: 81 TABLET ORAL at 09:46

## 2020-03-26 RX ADMIN — PRAVASTATIN SODIUM 80 MG: 80 TABLET ORAL at 17:39

## 2020-03-26 RX ADMIN — PHENYLEPHRINE HYDROCHLORIDE 200 MCG: 10 INJECTION INTRAVENOUS at 15:48

## 2020-03-26 RX ADMIN — SIMETHICONE CHEW TAB 80 MG 80 MG: 80 TABLET ORAL at 17:39

## 2020-03-26 RX ADMIN — LIDOCAINE 1 PATCH: 50 PATCH CUTANEOUS at 09:49

## 2020-03-26 RX ADMIN — ACETAMINOPHEN 975 MG: 325 TABLET ORAL at 22:46

## 2020-03-26 RX ADMIN — PHENYLEPHRINE HYDROCHLORIDE 200 MCG: 10 INJECTION INTRAVENOUS at 15:30

## 2020-03-26 RX ADMIN — LISINOPRIL 40 MG: 20 TABLET ORAL at 09:47

## 2020-03-26 RX ADMIN — HEPARIN SODIUM 5000 UNITS: 5000 INJECTION INTRAVENOUS; SUBCUTANEOUS at 05:56

## 2020-03-26 RX ADMIN — HEPARIN SODIUM 5000 UNITS: 5000 INJECTION INTRAVENOUS; SUBCUTANEOUS at 22:46

## 2020-03-26 RX ADMIN — ACETAMINOPHEN 975 MG: 325 TABLET ORAL at 05:56

## 2020-03-26 RX ADMIN — PROPOFOL 100 MG: 10 INJECTION, EMULSION INTRAVENOUS at 15:19

## 2020-03-26 RX ADMIN — PANTOPRAZOLE SODIUM 40 MG: 40 TABLET, DELAYED RELEASE ORAL at 05:56

## 2020-03-26 RX ADMIN — SIMETHICONE CHEW TAB 80 MG 80 MG: 80 TABLET ORAL at 22:46

## 2020-03-26 RX ADMIN — CYCLOBENZAPRINE HYDROCHLORIDE 10 MG: 10 TABLET, FILM COATED ORAL at 06:12

## 2020-03-26 RX ADMIN — PROPOFOL 50 MG: 10 INJECTION, EMULSION INTRAVENOUS at 15:30

## 2020-03-26 RX ADMIN — BUDESONIDE AND FORMOTEROL FUMARATE DIHYDRATE 2 PUFF: 80; 4.5 AEROSOL RESPIRATORY (INHALATION) at 09:40

## 2020-03-26 NOTE — ANESTHESIA PREPROCEDURE EVALUATION
Review of Systems/Medical History  Patient summary reviewed  Chart reviewed      Cardiovascular  Negative cardio ROS Exercise tolerance (METS): >4,  Hyperlipidemia, Hypertension ,    Pulmonary  Negative pulmonary ROS COPD moderate- medication dependent , Asthma , Sleep apnea ,        GI/Hepatic  Negative GI/hepatic ROS   GERD ,        Negative  ROS        Endo/Other  Negative endo/other ROS Diabetes ,      GYN  Negative gynecology ROS          Hematology  Negative hematology ROS      Musculoskeletal  Negative musculoskeletal ROS        Neurology  Negative neurology ROS      Psychology   Negative psychology ROS              Physical Exam    Airway    Mallampati score: II  TM Distance: >3 FB  Neck ROM: full     Dental   No notable dental hx     Cardiovascular  Comment: Negative ROS, Rhythm: regular, Rate: normal, Cardiovascular exam normal    Pulmonary  Pulmonary exam normal Breath sounds clear to auscultation,     Other Findings        Anesthesia Plan  ASA Score- 3     Anesthesia Type- general and IV sedation with anesthesia with ASA Monitors  Additional Monitors:   Airway Plan: ETT  Comment: Risks/benefits and alternatives discussed with patient including possible PONV, sore throat, and possibility of rare anesthetic and surgical emergencies  GA with OETT to contain the amount of potential viral aerosolization        Plan Factors- Patient instructed to abstain from smoking on day of procedure  Patient did not smoke on day of surgery  Induction- intravenous  Postoperative Plan- Plan for postoperative opioid use  Planned trial extubation    Informed Consent- Anesthetic plan and risks discussed with patient  I personally reviewed this patient with the CRNA  Discussed and agreed on the Anesthesia Plan with the CRNA  Alina Madrigal

## 2020-03-26 NOTE — ANESTHESIA POSTPROCEDURE EVALUATION
Post-Op Assessment Note    CV Status:  Stable  Pain Score: 1    Pain management: adequate     Mental Status:  Alert   Hydration Status:  Stable   PONV Controlled:  Controlled   Post Op Vitals Reviewed: Yes      Staff: AnesthesiologistKENDRA           /93 (03/26/20 1600)    Temp      Pulse 77 (03/26/20 1600)   Resp 20 (03/26/20 1600)    SpO2 96 % (03/26/20 1600)

## 2020-03-27 ENCOUNTER — TRANSCRIBE ORDERS (OUTPATIENT)
Dept: GASTROENTEROLOGY | Facility: CLINIC | Age: 65
End: 2020-03-27

## 2020-03-27 VITALS
OXYGEN SATURATION: 88 % | DIASTOLIC BLOOD PRESSURE: 85 MMHG | HEART RATE: 79 BPM | HEIGHT: 70 IN | TEMPERATURE: 98.4 F | BODY MASS INDEX: 29.35 KG/M2 | SYSTOLIC BLOOD PRESSURE: 127 MMHG | RESPIRATION RATE: 18 BRPM | WEIGHT: 205 LBS

## 2020-03-27 PROBLEM — R07.89 OTHER CHEST PAIN: Status: RESOLVED | Noted: 2020-03-24 | Resolved: 2020-03-27

## 2020-03-27 LAB — GLUCOSE SERPL-MCNC: 151 MG/DL (ref 65–140)

## 2020-03-27 PROCEDURE — NC001 PR NO CHARGE: Performed by: INTERNAL MEDICINE

## 2020-03-27 PROCEDURE — 99239 HOSP IP/OBS DSCHRG MGMT >30: CPT | Performed by: INTERNAL MEDICINE

## 2020-03-27 PROCEDURE — 82948 REAGENT STRIP/BLOOD GLUCOSE: CPT

## 2020-03-27 RX ORDER — CYCLOBENZAPRINE HCL 10 MG
10 TABLET ORAL 3 TIMES DAILY PRN
Qty: 30 TABLET | Refills: 0 | Status: SHIPPED | OUTPATIENT
Start: 2020-03-27

## 2020-03-27 RX ORDER — LIDOCAINE 50 MG/G
1 PATCH TOPICAL DAILY
Qty: 28 PATCH | Refills: 0 | Status: SHIPPED | OUTPATIENT
Start: 2020-03-27

## 2020-03-27 RX ORDER — POLYETHYLENE GLYCOL 3350 17 G/17G
17 POWDER, FOR SOLUTION ORAL DAILY
Qty: 14 EACH | Refills: 0 | Status: SHIPPED | OUTPATIENT
Start: 2020-03-27

## 2020-03-27 RX ADMIN — HEPARIN SODIUM 5000 UNITS: 5000 INJECTION INTRAVENOUS; SUBCUTANEOUS at 05:22

## 2020-03-27 RX ADMIN — PANTOPRAZOLE SODIUM 40 MG: 40 TABLET, DELAYED RELEASE ORAL at 05:22

## 2020-03-27 RX ADMIN — LIDOCAINE 1 PATCH: 50 PATCH CUTANEOUS at 09:15

## 2020-03-27 RX ADMIN — POLYETHYLENE GLYCOL 3350 17 G: 17 POWDER, FOR SOLUTION ORAL at 08:55

## 2020-03-27 RX ADMIN — METOPROLOL SUCCINATE 25 MG: 25 TABLET, EXTENDED RELEASE ORAL at 09:15

## 2020-03-27 RX ADMIN — BUDESONIDE AND FORMOTEROL FUMARATE DIHYDRATE 2 PUFF: 80; 4.5 AEROSOL RESPIRATORY (INHALATION) at 09:15

## 2020-03-27 RX ADMIN — CYCLOBENZAPRINE HYDROCHLORIDE 10 MG: 10 TABLET, FILM COATED ORAL at 09:15

## 2020-03-27 RX ADMIN — SERTRALINE HYDROCHLORIDE 75 MG: 50 TABLET ORAL at 09:15

## 2020-03-27 RX ADMIN — SIMETHICONE CHEW TAB 80 MG 80 MG: 80 TABLET ORAL at 09:15

## 2020-03-27 RX ADMIN — ASPIRIN 81 MG 81 MG: 81 TABLET ORAL at 09:15

## 2020-03-27 RX ADMIN — ACETAMINOPHEN 975 MG: 325 TABLET ORAL at 05:21

## 2020-03-27 RX ADMIN — LISINOPRIL 40 MG: 20 TABLET ORAL at 09:15

## 2020-03-27 RX ADMIN — DOCUSATE SODIUM 100 MG: 100 CAPSULE, LIQUID FILLED ORAL at 09:15

## 2020-10-15 ENCOUNTER — APPOINTMENT (EMERGENCY)
Dept: CT IMAGING | Facility: HOSPITAL | Age: 65
End: 2020-10-15
Payer: MEDICARE

## 2020-10-15 ENCOUNTER — HOSPITAL ENCOUNTER (EMERGENCY)
Facility: HOSPITAL | Age: 65
Discharge: HOME | End: 2020-10-16
Attending: EMERGENCY MEDICINE | Admitting: EMERGENCY MEDICINE
Payer: MEDICARE

## 2020-10-15 VITALS
TEMPERATURE: 98.2 F | BODY MASS INDEX: 30.13 KG/M2 | RESPIRATION RATE: 17 BRPM | WEIGHT: 210 LBS | OXYGEN SATURATION: 94 % | DIASTOLIC BLOOD PRESSURE: 82 MMHG | HEART RATE: 82 BPM | SYSTOLIC BLOOD PRESSURE: 147 MMHG

## 2020-10-15 DIAGNOSIS — R10.9 ABDOMINAL PAIN: ICD-10-CM

## 2020-10-15 DIAGNOSIS — K92.1 HEMATOCHEZIA: Primary | ICD-10-CM

## 2020-10-15 LAB
ABO GROUP BLD: NORMAL
ABO GROUP BLD: NORMAL
ALBUMIN SERPL BCP-MCNC: 3.8 G/DL (ref 3.5–5.7)
ALP SERPL-CCNC: 44 U/L (ref 55–165)
ALT SERPL W P-5'-P-CCNC: 12 U/L (ref 7–52)
ANION GAP SERPL CALCULATED.3IONS-SCNC: 8 MMOL/L (ref 4–13)
APTT PPP: 27 SECONDS (ref 23–37)
AST SERPL W P-5'-P-CCNC: 18 U/L (ref 13–39)
BASOPHILS # BLD AUTO: 0 THOUSANDS/ΜL (ref 0–0.1)
BASOPHILS NFR BLD AUTO: 0 % (ref 0–2)
BILIRUB SERPL-MCNC: 0.2 MG/DL (ref 0.2–1)
BILIRUB UR QL STRIP: NEGATIVE
BLD GP AB SCN SERPL QL: NEGATIVE
BUN SERPL-MCNC: 20 MG/DL (ref 7–25)
CALCIUM SERPL-MCNC: 8.4 MG/DL (ref 8.6–10.5)
CHLORIDE SERPL-SCNC: 109 MMOL/L (ref 98–107)
CLARITY UR: CLEAR
CO2 SERPL-SCNC: 22 MMOL/L (ref 21–31)
COLOR UR: YELLOW
CREAT SERPL-MCNC: 1.27 MG/DL (ref 0.7–1.3)
EOSINOPHIL # BLD AUTO: 0.1 THOUSAND/ΜL (ref 0–0.61)
EOSINOPHIL NFR BLD AUTO: 2 % (ref 0–5)
ERYTHROCYTE [DISTWIDTH] IN BLOOD BY AUTOMATED COUNT: 16.6 % (ref 11.5–14.5)
GFR SERPL CREATININE-BSD FRML MDRD: 58 ML/MIN/1.73SQ M
GLUCOSE SERPL-MCNC: 99 MG/DL (ref 65–99)
GLUCOSE UR STRIP-MCNC: NEGATIVE MG/DL
HCT VFR BLD AUTO: 34.2 % (ref 42–47)
HGB BLD-MCNC: 11.3 G/DL (ref 14–18)
HGB UR QL STRIP.AUTO: NEGATIVE
INR PPP: 1.05 (ref 0.84–1.19)
KETONES UR STRIP-MCNC: NEGATIVE MG/DL
LEUKOCYTE ESTERASE UR QL STRIP: NEGATIVE
LIPASE SERPL-CCNC: 64 U/L (ref 11–82)
LYMPHOCYTES # BLD AUTO: 1.9 THOUSANDS/ΜL (ref 0.6–4.47)
LYMPHOCYTES NFR BLD AUTO: 31 % (ref 21–51)
MCH RBC QN AUTO: 28.3 PG (ref 26–34)
MCHC RBC AUTO-ENTMCNC: 33.2 G/DL (ref 31–37)
MCV RBC AUTO: 86 FL (ref 81–99)
MONOCYTES # BLD AUTO: 0.5 THOUSAND/ΜL (ref 0.17–1.22)
MONOCYTES NFR BLD AUTO: 8 % (ref 2–12)
NEUTROPHILS # BLD AUTO: 3.5 THOUSANDS/ΜL (ref 1.4–6.5)
NEUTS SEG NFR BLD AUTO: 58 % (ref 42–75)
NITRITE UR QL STRIP: NEGATIVE
PH UR STRIP.AUTO: 5.5 [PH]
PLATELET # BLD AUTO: 210 THOUSANDS/UL (ref 149–390)
PMV BLD AUTO: 6.8 FL (ref 8.6–11.7)
POTASSIUM SERPL-SCNC: 4 MMOL/L (ref 3.5–5.5)
PROT SERPL-MCNC: 6.2 G/DL (ref 6.4–8.9)
PROT UR STRIP-MCNC: NEGATIVE MG/DL
PROTHROMBIN TIME: 13.6 SECONDS (ref 11.6–14.5)
RBC # BLD AUTO: 4 MILLION/UL (ref 4.3–5.9)
RH BLD: POSITIVE
RH BLD: POSITIVE
SODIUM SERPL-SCNC: 139 MMOL/L (ref 134–143)
SP GR UR STRIP.AUTO: 1.01 (ref 1–1.03)
SPECIMEN EXPIRATION DATE: NORMAL
UROBILINOGEN UR QL STRIP.AUTO: 0.2 E.U./DL
WBC # BLD AUTO: 6 THOUSAND/UL (ref 4.8–10.8)

## 2020-10-15 PROCEDURE — 85025 COMPLETE CBC W/AUTO DIFF WBC: CPT | Performed by: PHYSICIAN ASSISTANT

## 2020-10-15 PROCEDURE — 86850 RBC ANTIBODY SCREEN: CPT | Performed by: PHYSICIAN ASSISTANT

## 2020-10-15 PROCEDURE — 85610 PROTHROMBIN TIME: CPT | Performed by: PHYSICIAN ASSISTANT

## 2020-10-15 PROCEDURE — 99284 EMERGENCY DEPT VISIT MOD MDM: CPT | Performed by: PHYSICIAN ASSISTANT

## 2020-10-15 PROCEDURE — 36415 COLL VENOUS BLD VENIPUNCTURE: CPT | Performed by: PHYSICIAN ASSISTANT

## 2020-10-15 PROCEDURE — 96360 HYDRATION IV INFUSION INIT: CPT

## 2020-10-15 PROCEDURE — 81003 URINALYSIS AUTO W/O SCOPE: CPT | Performed by: PHYSICIAN ASSISTANT

## 2020-10-15 PROCEDURE — 85730 THROMBOPLASTIN TIME PARTIAL: CPT | Performed by: PHYSICIAN ASSISTANT

## 2020-10-15 PROCEDURE — G1004 CDSM NDSC: HCPCS

## 2020-10-15 PROCEDURE — 80053 COMPREHEN METABOLIC PANEL: CPT | Performed by: PHYSICIAN ASSISTANT

## 2020-10-15 PROCEDURE — 83690 ASSAY OF LIPASE: CPT | Performed by: PHYSICIAN ASSISTANT

## 2020-10-15 PROCEDURE — 86901 BLOOD TYPING SEROLOGIC RH(D): CPT | Performed by: PHYSICIAN ASSISTANT

## 2020-10-15 PROCEDURE — 99285 EMERGENCY DEPT VISIT HI MDM: CPT

## 2020-10-15 PROCEDURE — 86900 BLOOD TYPING SEROLOGIC ABO: CPT | Performed by: PHYSICIAN ASSISTANT

## 2020-10-15 PROCEDURE — 74177 CT ABD & PELVIS W/CONTRAST: CPT

## 2020-10-15 PROCEDURE — 93005 ELECTROCARDIOGRAM TRACING: CPT

## 2020-10-15 RX ORDER — BUPROPION HYDROCHLORIDE 150 MG/1
150 TABLET, EXTENDED RELEASE ORAL 2 TIMES DAILY
COMMUNITY

## 2020-10-15 RX ADMIN — SODIUM CHLORIDE 1000 ML: 0.9 INJECTION, SOLUTION INTRAVENOUS at 19:56

## 2020-10-15 RX ADMIN — IOHEXOL 100 ML: 350 INJECTION, SOLUTION INTRAVENOUS at 21:28

## 2020-10-17 LAB
ATRIAL RATE: 92 BPM
P AXIS: 69 DEGREES
PR INTERVAL: 160 MS
QRS AXIS: -88 DEGREES
QRSD INTERVAL: 126 MS
QT INTERVAL: 386 MS
QTC INTERVAL: 477 MS
T WAVE AXIS: 55 DEGREES
VENTRICULAR RATE: 92 BPM

## 2020-10-17 PROCEDURE — 93010 ELECTROCARDIOGRAM REPORT: CPT | Performed by: INTERNAL MEDICINE

## 2022-02-06 ENCOUNTER — OFFICE VISIT (OUTPATIENT)
Dept: URGENT CARE | Facility: CLINIC | Age: 67
End: 2022-02-06
Payer: MEDICARE

## 2022-02-06 VITALS
HEART RATE: 89 BPM | BODY MASS INDEX: 28.63 KG/M2 | OXYGEN SATURATION: 99 % | DIASTOLIC BLOOD PRESSURE: 63 MMHG | TEMPERATURE: 98 F | HEIGHT: 70 IN | SYSTOLIC BLOOD PRESSURE: 115 MMHG | WEIGHT: 200 LBS | RESPIRATION RATE: 18 BRPM

## 2022-02-06 DIAGNOSIS — S01.111A EYEBROW LACERATION, RIGHT, INITIAL ENCOUNTER: Primary | ICD-10-CM

## 2022-02-06 PROCEDURE — 12013 RPR F/E/E/N/L/M 2.6-5.0 CM: CPT | Performed by: FAMILY MEDICINE

## 2022-02-06 PROCEDURE — 99204 OFFICE O/P NEW MOD 45 MIN: CPT | Performed by: FAMILY MEDICINE

## 2022-02-06 PROCEDURE — G0463 HOSPITAL OUTPT CLINIC VISIT: HCPCS | Performed by: FAMILY MEDICINE

## 2022-02-06 PROCEDURE — 90715 TDAP VACCINE 7 YRS/> IM: CPT

## 2022-02-06 RX ORDER — CEPHALEXIN 500 MG/1
500 CAPSULE ORAL
Qty: 15 CAPSULE | Refills: 0 | Status: SHIPPED | OUTPATIENT
Start: 2022-02-06 | End: 2022-02-11

## 2022-02-06 NOTE — PATIENT INSTRUCTIONS
Right eyebrow laceration  Repaired with 5 sutures  Keep  Wound clean, dry  Cover if going out into dirty her [place] environment  Try to not get wound wet  If it gets wet leave it air dry or dab it dry  Neosporin to wound once a day  Antibiotic cephalexin 500 mg-1 tablet by mouth 3 times daily for 5 days to prevent infection  You received a tetanus/diphtheria / pertussis immunization today  Follow-up in 8-9 days with your primary care doctor or urgent care for suture removal     Follow-up sooner if there is redness, swelling, tenderness or drainage

## 2022-02-06 NOTE — PROGRESS NOTES
[facility]        NAME: Lynn Alan is a 74 y o  male  : 1955    MRN: [id number]  DATE: 2022  TIME: 7:07 PM    Assessment and Plan   Eyebrow laceration, right, initial encounter [S01 111A]  1  Eyebrow laceration, right, initial encounter  Tdap Vaccine greater than or equal to 8yo    cephalexin (KEFLEX) 500 mg capsule         Patient Instructions    Right eyebrow laceration  Repaired with 5 sutures  Keep  Wound clean, dry  Cover if going out into dirty her [place] environment  Try to not get wound wet  If it gets wet leave it air dry or dab it dry  Neosporin to wound once a day  Antibiotic cephalexin 500 mg-1 tablet by mouth 3 times daily for 5 days to prevent infection  You received a tetanus/diphtheria / pertussis immunization today  Follow-up in 8-9 days with your primary care doctor or urgent care for suture removal     Follow-up sooner if there is redness, swelling, tenderness or drainage  Follow up with PCP in 3-5 days  Proceed to  ER if symptoms worsen  Chief Complaint     Chief Complaint   Patient presents with    Laceration     became dizzy and fell in house at 9 pm yesterday, hit head, 3 cm laceration to right eyebrow         History of Present Illness       Patient presents for evaluation of right eyebrow laceration which occurred yesterday  Patient was walking and tripped at home striking his right eyebrow  There was no loss of consciousness  Patient denies any headache, blurry vision or eye pain  No concussive symptoms  Last tetanus immunization unknown  Bleeding has stopped  Review of Systems   Review of Systems   Constitutional: Negative for chills, fatigue and fever  Eyes: Negative for photophobia, pain and visual disturbance  Cardiovascular: Negative for chest pain and palpitations  Musculoskeletal: Negative for neck pain and neck stiffness  Skin: Positive for wound     Neurological: Negative for syncope, speech difficulty, weakness, light-headedness, numbness and headaches           Current Medications       Current Outpatient Medications:     albuterol (PROVENTIL HFA,VENTOLIN HFA) 90 mcg/act inhaler, Inhale 2 puffs every 6 (six) hours as needed, Disp: , Rfl:     buPROPion (WELLBUTRIN SR) 150 mg 12 hr tablet, Take 150 mg by mouth 2 (two) times a day, Disp: , Rfl:     cyclobenzaprine (FLEXERIL) 10 mg tablet, Take 1 tablet (10 mg total) by mouth 3 (three) times a day as needed for muscle spasms, Disp: 30 tablet, Rfl: 0    glipiZIDE (GLUCOTROL) 5 mg tablet, 1 tab with dinner , Disp: , Rfl:     metFORMIN (GLUCOPHAGE) 500 mg tablet, Take 500 mg by mouth 3 (three) times a day with meals , Disp: , Rfl:     metoprolol succinate (TOPROL-XL) 25 mg 24 hr tablet, Take 25 mg by mouth daily , Disp: , Rfl:     pantoprazole (PROTONIX) 40 mg tablet, take 1 tablet by mouth once daily, Disp: , Rfl:     polyethylene glycol (MIRALAX) 17 g packet, Take 17 g by mouth daily, Disp: 14 each, Rfl: 0    sertraline (ZOLOFT) 50 mg tablet, 100 mg , Disp: , Rfl:     simvastatin (ZOCOR) 40 mg tablet, Take 80 mg by mouth every evening , Disp: , Rfl:     aspirin 81 mg chewable tablet, Chew 324 mg daily , Disp: , Rfl:     budesonide-formoterol (Symbicort) 80-4 5 MCG/ACT inhaler, Inhale 2 puffs 2 (two) times a day, Disp: , Rfl:     cephalexin (KEFLEX) 500 mg capsule, Take 1 capsule (500 mg total) by mouth 3 (three) times a day with meals for 5 days, Disp: 15 capsule, Rfl: 0    gabapentin (NEURONTIN) 300 mg capsule, Take 300 mg by mouth daily , Disp: , Rfl:     lidocaine (LIDODERM) 5 %, Apply 1 patch topically daily Remove & Discard patch within 12 hours or as directed by MD (Patient not taking: Reported on 2/6/2022 ), Disp: 28 patch, Rfl: 0    lisinopril (ZESTRIL) 40 mg tablet, Take 40 mg by mouth daily , Disp: , Rfl:     Current Allergies     Allergies as of 02/06/2022    (No Known Allergies)            The following portions of the patient's history were reviewed and updated as appropriate: allergies, current medications, past family history, past medical history, past social history, past surgical history and problem list      Past Medical History:   Diagnosis Date    Asthma     Diabetes mellitus ([address] )     GERD (gastroesophageal reflux disease)     Hypertension        Past Surgical History:   Procedure Laterality Date    ABDOMINAL SURGERY      HERNIA REPAIR      umbilical       History reviewed  No pertinent family history  Medications have been verified  Objective   /63   Pulse 89   Temp 98 °F (36 7 °C) (Temporal)   Resp 18   Ht 5' 10" (1 778 m)   Wt 90 7 kg (200 lb)   SpO2 99%   BMI 28 70 kg/m²   No LMP for male patient  Physical Exam     Physical Exam  Constitutional:       General: He is not in acute distress  Appearance: He is not ill-appearing or toxic-appearing  HENT:      Head:      Comments: Right eyebrow with 3 5 cm or elbow laceration all within the eyebrow  No eyelid or eye involvement  It is about 7-8 mm deep but edges are well overlapping  No foreign bodies  No current bleeding  Eyes:      Extraocular Movements: Extraocular movements intact  Conjunctiva/sclera: Conjunctivae normal       Pupils: Pupils are equal, round, and reactive to light  Skin:     General: Skin is warm and dry  Neurological:      Mental Status: He is alert  Laceration repair    Date/Time: 2/6/2022 7:05 PM  Performed by: Yannick Govea MD  Authorized by: Yannick Govea MD   Consent: Verbal consent obtained    Consent given by: patient  Patient identity confirmed: verbally with patient  Body area: head/neck  Location details: right eyebrow  Laceration length: 3 5 cm  Foreign bodies: no foreign bodies  Nerve involvement: none  Vascular damage: no  Anesthesia: local infiltration    Anesthesia:  Local Anesthetic: lidocaine 1% without epinephrine  Anesthetic total: 1 5 mL    Wound Dehiscence:  Superficial Wound Dehiscence: simple closure      Procedure Details:  Preparation: Patient was prepped and draped in the usual sterile fashion    Irrigation solution: saline  Irrigation method: syringe  Amount of cleaning: standard  Debridement: none  Degree of undermining: none  Skin closure: 5-0 nylon  Number of sutures: 5  Technique: simple  Approximation: close  Approximation difficulty: simple  Dressing: antibiotic ointment  Patient tolerance: patient tolerated the procedure well with no immediate complications

## 2022-03-10 ENCOUNTER — RX ONLY (RX ONLY)
Age: 67
End: 2022-03-10

## 2022-03-10 ENCOUNTER — DOCTOR'S OFFICE (OUTPATIENT)
Dept: URBAN - METROPOLITAN AREA CLINIC 125 | Facility: CLINIC | Age: 67
Setting detail: OPHTHALMOLOGY
End: 2022-03-10
Payer: COMMERCIAL

## 2022-03-10 DIAGNOSIS — H11.063: ICD-10-CM

## 2022-03-10 DIAGNOSIS — H00.12: ICD-10-CM

## 2022-03-10 DIAGNOSIS — L03.213: ICD-10-CM

## 2022-03-10 DIAGNOSIS — H02.102: ICD-10-CM

## 2022-03-10 PROCEDURE — 92004 COMPRE OPH EXAM NEW PT 1/>: CPT | Performed by: OPHTHALMOLOGY

## 2022-03-10 PROCEDURE — 67800 REMOVE EYELID LESION: CPT | Performed by: OPHTHALMOLOGY

## 2022-03-10 ASSESSMENT — CORNEAL PTERYGIUM
OS_PTERYGIUM: NASAL 1MM
OD_PTERYGIUM: NASAL

## 2022-03-10 ASSESSMENT — LID EXAM ASSESSMENTS
OS_MEIBOMITIS: 1+
OD_MEIBOMITIS: 1+

## 2022-03-10 ASSESSMENT — VISUAL ACUITY
OD_BCVA: 20/40-1
OS_BCVA: 20/40-2

## 2022-03-10 ASSESSMENT — CONFRONTATIONAL VISUAL FIELD TEST (CVF)
OD_FINDINGS: FULL
OS_FINDINGS: FULL

## 2022-03-10 ASSESSMENT — LID POSITION - ECTROPION: OD_ECTROPION: RLL

## 2022-03-17 ENCOUNTER — DOCTOR'S OFFICE (OUTPATIENT)
Dept: URBAN - METROPOLITAN AREA CLINIC 125 | Facility: CLINIC | Age: 67
Setting detail: OPHTHALMOLOGY
End: 2022-03-17
Payer: COMMERCIAL

## 2022-03-17 DIAGNOSIS — H00.12: ICD-10-CM

## 2022-03-17 DIAGNOSIS — H40.033: ICD-10-CM

## 2022-03-17 DIAGNOSIS — H02.102: ICD-10-CM

## 2022-03-17 DIAGNOSIS — L03.213: ICD-10-CM

## 2022-03-17 PROBLEM — H11.063 PTERYGIUM; BOTH EYES: Status: ACTIVE | Noted: 2022-03-10

## 2022-03-17 PROCEDURE — 99024 POSTOP FOLLOW-UP VISIT: CPT | Performed by: OPHTHALMOLOGY

## 2022-03-17 PROCEDURE — 92020 GONIOSCOPY: CPT | Performed by: OPHTHALMOLOGY

## 2022-03-17 ASSESSMENT — KERATOMETRY
OS_K2POWER_DIOPTERS: 45.25
OD_K1POWER_DIOPTERS: 44.75
OS_AXISANGLE_DEGREES: 076
OS_K1POWER_DIOPTERS: 44.00
OD_AXISANGLE_DEGREES: 100
OD_K2POWER_DIOPTERS: 46.00

## 2022-03-17 ASSESSMENT — VISUAL ACUITY
OS_BCVA: 20/70
OD_BCVA: 20/60-2

## 2022-03-17 ASSESSMENT — REFRACTION_AUTOREFRACTION
OS_CYLINDER: -3.75
OD_CYLINDER: -1.75
OD_AXIS: 094
OD_SPHERE: +1.50
OS_AXIS: 067
OS_SPHERE: +3.00

## 2022-03-17 ASSESSMENT — SPHEQUIV_DERIVED
OD_SPHEQUIV: 0.625
OS_SPHEQUIV: 1.125

## 2022-03-17 ASSESSMENT — AXIALLENGTH_DERIVED
OD_AL: 22.6951
OS_AL: 22.77

## 2022-03-17 ASSESSMENT — CORNEAL PTERYGIUM
OS_PTERYGIUM: NASAL 1MM
OD_PTERYGIUM: NASAL

## 2022-03-17 ASSESSMENT — LID EXAM ASSESSMENTS
OD_MEIBOMITIS: 1+
OS_MEIBOMITIS: 1+

## 2022-03-17 ASSESSMENT — CONFRONTATIONAL VISUAL FIELD TEST (CVF)
OS_FINDINGS: FULL
OD_FINDINGS: FULL

## 2022-09-09 ENCOUNTER — OFFICE VISIT (OUTPATIENT)
Dept: URGENT CARE | Facility: CLINIC | Age: 67
End: 2022-09-09
Payer: MEDICARE

## 2022-09-09 ENCOUNTER — APPOINTMENT (OUTPATIENT)
Dept: RADIOLOGY | Facility: CLINIC | Age: 67
End: 2022-09-09
Payer: MEDICARE

## 2022-09-09 VITALS
SYSTOLIC BLOOD PRESSURE: 126 MMHG | HEART RATE: 74 BPM | TEMPERATURE: 98.5 F | OXYGEN SATURATION: 96 % | BODY MASS INDEX: 28.09 KG/M2 | HEIGHT: 70 IN | DIASTOLIC BLOOD PRESSURE: 61 MMHG | RESPIRATION RATE: 16 BRPM | WEIGHT: 196.2 LBS

## 2022-09-09 DIAGNOSIS — R05.9 COUGH: ICD-10-CM

## 2022-09-09 DIAGNOSIS — R05.9 COUGH: Primary | ICD-10-CM

## 2022-09-09 DIAGNOSIS — R09.81 CONGESTION OF NASAL SINUS: ICD-10-CM

## 2022-09-09 LAB
SARS-COV-2 AG UPPER RESP QL IA: NEGATIVE
VALID CONTROL: NORMAL

## 2022-09-09 PROCEDURE — 99213 OFFICE O/P EST LOW 20 MIN: CPT | Performed by: NURSE PRACTITIONER

## 2022-09-09 PROCEDURE — 87811 SARS-COV-2 COVID19 W/OPTIC: CPT | Performed by: NURSE PRACTITIONER

## 2022-09-09 PROCEDURE — 71046 X-RAY EXAM CHEST 2 VIEWS: CPT

## 2022-09-09 PROCEDURE — G0463 HOSPITAL OUTPT CLINIC VISIT: HCPCS | Performed by: NURSE PRACTITIONER

## 2022-09-09 NOTE — PROGRESS NOTES
[facility]        NAME: Jignesh Beauchamp is a 69 y o  male  : 1955    MRN: [id number]  DATE: 2022  TIME: 5:08 PM    Assessment and Plan   Cough [R05 9]  1  Cough  XR chest pa & lateral   2  Congestion of nasal sinus  Poct Covid 19 Rapid Antigen Test     Rapid covid negative  Chest xray shows no acute cardiopulmonary disease  ambulatory pulse ox 95%  Patient Instructions     Patient Instructions   Take medication as directed  Rest and drink plenty of fluids  A cool mist humidifier can be helpful  If you develop a worsening cough, chest pain, shortness of breath, palpitations, coughing up blood, prolonged high fever, any new or concerning symptoms please return or proceed ER  Recommend following up with PCP in 3-5 days        Follow up with PCP in 3-5 days  Proceed to  ER if symptoms worsen  Chief Complaint     Chief Complaint   Patient presents with    Cough     Coughing and body aches , sob, started 3 days ago wife is Covid positive today  History of Present Illness       Cough  This is a new problem  Episode onset: 3 days ago  The problem has been unchanged  The problem occurs every few minutes  The cough is non-productive  Associated symptoms include nasal congestion, rhinorrhea and shortness of breath (with exertion)  Pertinent negatives include no chest pain, chills, ear pain, fever, headaches, myalgias, postnasal drip, rash, sore throat or wheezing  Nothing aggravates the symptoms  Risk factors: patient's wife has covid  He has tried OTC cough suppressant for the symptoms  The treatment provided mild relief  There is no history of asthma or COPD  Review of Systems   Review of Systems   Constitutional: Negative for chills, diaphoresis, fatigue and fever  HENT: Positive for congestion and rhinorrhea  Negative for ear pain, facial swelling, mouth sores, postnasal drip, sinus pressure, sinus pain, sore throat and trouble swallowing      Eyes: Negative for photophobia and visual disturbance  Respiratory: Positive for cough and shortness of breath (with exertion)  Negative for chest tightness and wheezing  Cardiovascular: Negative for chest pain and palpitations  Gastrointestinal: Negative for abdominal pain, diarrhea, nausea and vomiting  Genitourinary: Negative  Musculoskeletal: Negative for arthralgias, back pain, joint swelling, myalgias, neck pain and neck stiffness  Skin: Negative for rash  Neurological: Negative for dizziness, facial asymmetry, weakness, light-headedness, numbness and headaches           Current Medications       Current Outpatient Medications:     albuterol (PROVENTIL HFA,VENTOLIN HFA) 90 mcg/act inhaler, Inhale 2 puffs every 6 (six) hours as needed, Disp: , Rfl:     buPROPion (WELLBUTRIN SR) 150 mg 12 hr tablet, Take 150 mg by mouth 2 (two) times a day, Disp: , Rfl:     cyclobenzaprine (FLEXERIL) 10 mg tablet, Take 1 tablet (10 mg total) by mouth 3 (three) times a day as needed for muscle spasms, Disp: 30 tablet, Rfl: 0    glipiZIDE (GLUCOTROL) 5 mg tablet, 1 tab with dinner , Disp: , Rfl:     metFORMIN (GLUCOPHAGE) 500 mg tablet, Take 500 mg by mouth 3 (three) times a day with meals , Disp: , Rfl:     metoprolol succinate (TOPROL-XL) 25 mg 24 hr tablet, Take 25 mg by mouth daily , Disp: , Rfl:     pantoprazole (PROTONIX) 40 mg tablet, take 1 tablet by mouth once daily, Disp: , Rfl:     polyethylene glycol (MIRALAX) 17 g packet, Take 17 g by mouth daily, Disp: 14 each, Rfl: 0    sertraline (ZOLOFT) 50 mg tablet, 100 mg , Disp: , Rfl:     simvastatin (ZOCOR) 40 mg tablet, Take 80 mg by mouth every evening , Disp: , Rfl:     aspirin 81 mg chewable tablet, Chew 324 mg daily , Disp: , Rfl:     budesonide-formoterol (Symbicort) 80-4 5 MCG/ACT inhaler, Inhale 2 puffs 2 (two) times a day, Disp: , Rfl:     gabapentin (NEURONTIN) 300 mg capsule, Take 300 mg by mouth daily , Disp: , Rfl:     lidocaine (LIDODERM) 5 %, Apply 1 patch topically daily Remove & Discard patch within 12 hours or as directed by MD (Patient not taking: No sig reported), Disp: 28 patch, Rfl: 0    lisinopril (ZESTRIL) 40 mg tablet, Take 40 mg by mouth daily , Disp: , Rfl:     Current Allergies     Allergies as of 09/09/2022    (No Known Allergies)            The following portions of the patient's history were reviewed and updated as appropriate: allergies, current medications, past family history, past medical history, past social history, past surgical history and problem list      Past Medical History:   Diagnosis Date    Asthma     Diabetes mellitus ([facility] [address] )     GERD (gastroesophageal reflux disease)     Hypertension        Past Surgical History:   Procedure Laterality Date    ABDOMINAL SURGERY      HERNIA REPAIR      umbilical       History reviewed  No pertinent family history  Medications have been verified  Objective   /61   Pulse 74   Temp 98 5 °F (36 9 °C)   Resp 16   Ht 5' 10" (1 778 m)   Wt 89 kg (196 lb 3 2 oz)   SpO2 96%   BMI 28 15 kg/m²   No LMP for male patient  Physical Exam     Physical Exam  Constitutional:       General: He is not in acute distress  Appearance: He is well-developed  HENT:      Head: Normocephalic and atraumatic  Right Ear: Hearing, tympanic membrane, ear canal and external ear normal       Left Ear: Hearing, tympanic membrane, ear canal and external ear normal       Nose: Congestion and rhinorrhea present  No mucosal edema  Right Sinus: No maxillary sinus tenderness or frontal sinus tenderness  Left Sinus: No maxillary sinus tenderness or frontal sinus tenderness  Mouth/Throat:      Mouth: Mucous membranes are moist       Pharynx: Oropharynx is clear  Uvula midline  No oropharyngeal exudate or posterior oropharyngeal erythema  Tonsils: No tonsillar exudate or tonsillar abscesses  1+ on the right  1+ on the left     Cardiovascular:      Rate and Rhythm: Normal rate and regular rhythm  Heart sounds: Normal heart sounds, S1 normal and S2 normal    Pulmonary:      Effort: Pulmonary effort is normal       Breath sounds: Normal breath sounds and air entry  Lymphadenopathy:      Cervical: No cervical adenopathy  Skin:     General: Skin is warm and dry  Capillary Refill: Capillary refill takes less than 2 seconds  Neurological:      Mental Status: He is alert and oriented to person, place, and time